# Patient Record
Sex: MALE | Race: BLACK OR AFRICAN AMERICAN
[De-identification: names, ages, dates, MRNs, and addresses within clinical notes are randomized per-mention and may not be internally consistent; named-entity substitution may affect disease eponyms.]

---

## 2017-10-16 NOTE — RAD
PORTABLE AP CHEST X-RAY:

 

10/16/2017

 

HISTORY:

Altered mental status.

 

COMPARISON:

10/19/2006

 

FINDINGS:

A dual-lead right subclavian cardiac pacemaker device is noted in place.  The cardiac silhouette and
 pulmonary vasculature are within normal limits.  Vascular calcifications are seen in an ectatic tho
racic aorta.  There is bilateral glenohumeral osteoarthropathy with narrowing of the subacromial spa
saúl bilaterally.

 

IMPRESSION:

No acute cardiopulmonary process.

 

POS: [facility]

## 2017-10-16 NOTE — PDOC.PN
- Subjective


Encounter Start Date: 10/16/17


Encounter Start Time: 08:42


Subjective: Confused


-: No agitation


-: No fevers this AM





- Objective


MAR Reviewed: Yes


Vital Signs & Weight: 


 Vital Signs (12 hours)











  Temp Pulse Resp BP Pulse Ox


 


 10/16/17 07:56  98.1 F  61  18  136/70  100








 Weight











Weight                         148 lb 0.011 oz














Result Diagrams: 


 10/16/17 05:01





 10/16/17 05:01





Phys Exam





- Physical Examination


Constitutional: NAD


HEENT: PERRLA, moist MMs


Neck: no nodes, no JVD


Respiratory: no wheezing, no rales, no rhonchi


Cardiovascular: RRR, no significant murmur


Gastrointestinal: soft, non-tender, positive bowel sounds


Deviation from normal: unable to assess


Skin: no rash, normal turgor





Dx/Plan


(1) History of CVA (cerebrovascular accident)


Code(s): Z86.73 - PRSNL HX OF TIA (TIA), AND CEREB INFRC W/O RESID DEFICITS   

Status: Acute   





(2) Metabolic encephalopathy


Code(s): G93.41 - METABOLIC ENCEPHALOPATHY   Status: Acute   





(3) NSTEMI (non-ST elevated myocardial infarction)


Code(s): I21.4 - NON-ST ELEVATION (NSTEMI) MYOCARDIAL INFARCTION   Status: 

Acute   





- Plan





Encephalopathy 2/2 UTI


* CT brain: no acute issue


* CXR: pending


* Urine cx: pending


* Blood cx: pending


* start Emperic Abx: Ceftriaxone


* consult ST to assess swallow function, as he is at risk for aspiration while 

encephalopathic


* Fall precautions





NSTEMI


* trend cardiac enzymes


* cardiology consulted





H/o CVA with Physical Deconditioning


* PT/OT


* Fall precautions





Dispo: continue inpt.

## 2017-10-16 NOTE — HP
CHIEF COMPLAINT:  Fall, confusion.

 

HISTORY OF PRESENT ILLNESS:  The patient is a 76-year-old male with past 
medical history of hypertension, CVA, hyperlipidemia, osteoarthritis, now came 
to the hospital because of the fall and history obtained from the patient's 
family as the patient is currently confused.  Per family members, the patient 
has complained of bilateral lower extremity swelling and pain since this 
morning.  The patient had a fall, following the fall the patient appears 
confused and so EMS was called.  Upon EMS arrival the patient was found 
confused.  The patient was brought to the ER.  The patient is lethargic, but no 
other history available from the patient at this time, The patient per family, 
the patient was confused since this evening.  Denies any cough, denies sputum 
production, denies any fever, denies any chills.

 

PAST MEDICAL HISTORY:  As per HPI.

 

PAST SURGICAL HISTORY:  Pacemaker.

 

SOCIAL HISTORY:  Denies smoking, denies alcohol, denies any drugs.

 

MEDICATIONS:  Reviewed.

 

FAMILY HISTORY:  Positive for heart problems.

 

REVIEW OF SYSTEMS:  Unavailable from the patient as the patient is currently 
lethargic.

 

PHYSICAL EXAMINATION:  

CONSTITUTIONAL/VITAL SIGNS:  At the time of H\T\P performed, blood pressure is 
130/70, afebrile, pulse ox 97% on 3 liters.

GENERAL:  The patient appears lethargic, but arousable.

HEENT:  Eyes; scleral icterus, conjunctival erythema present, nose normal, ENT 
patent.  Poor dentition.  

NECK:  Supple, no JVD.

CARDIOVASCULAR:  S1, S2 present.  Regular rate and rhythm, no murmurs, no rubs, 
no gallops.

RESPIRATORY SYSTEM:  Positive for rhonchi.  No wheezing.  Diminished at the 
bases.

GASTROINTESTINAL:  Abdomen is soft, nontender, no guarding, no organomegaly, no 
masses felt.

MUSCULOSKELETAL:  Bilateral 2+ edema present.

INTEGUMENTARY:  No rashes seen.

PSYCHIATRIC:  Cranial nerve intact.  Follows commands.  Strength intact, 
sensory intact.

GENITOURINARY:  No suprapubic tenderness.

NEUROLOGIC:  Patient follows commands, not oriented.

PSYCHIATRIC:  Mood calm.

 

LABORATORY:  White count 7.7, hemoglobin 15.2, platelet count is 271.  Sodium 
147, potassium 3.5, chloride 110, CO2 of 23, BUN of 24, creatinine 1.5.  Lactic 
acid 3.7, CK-MB is 43.8, troponin 0.141, globulin 3.9.  EKG, no acute ST 
changes.  Lactic acid 3.7.

 

IMAGING:  CT head, no acute process seen.

 

ASSESSMENT AND PLAN:    The patient is a 76-year-old male.

1.  Metabolic encephalopathy, need to rule out urinary tract infection.  Plan 
to consult Neurology to evaluate the patient.  Plan to monitor the patient 
closely.

2.  History of hypertension, monitor blood pressure.  Continue pressure meds.

3.  Non-ST-segment elevation myocardial infarction.  Plan to check cardiac 
enzymes.  Plan to consult Cardiology.  Plan to give a dose of Lovenox if CT 
head negative.

4.  History of cerebrovascular accident.  Plan to monitor the patient closely.  
Plan to get PT, OT, speech therapy evaluation. 

5.  History of hypertension.  Monitor blood.  Continue blood pressure meds.  

 

The case was discussed in detail with the patient.  The patient is full code.

 

MTDD

## 2017-10-16 NOTE — PDOC.EVN
Event Note





- Event Note


Event Note: 





[id number]


H&P Dictated


1. Metabolic encephalopathy


2. H/O CVA


3. NSTEMI


4. h/o htn





pLAN:


SEE ORDERS

## 2017-10-16 NOTE — CON
DATE OF CONSULTATION:  10/16/2017

 

REASON FOR CONSULTATION:  Syncope.

 

REFERRING PROVIDER:  Dr. Yoon.

 

HISTORY OF PRESENT ILLNESS:  Mr. Reyes is a very pleasant 76-year-old gentleman, who I have seen and 
evaluated in the past.  He has a history of moderate CAD, diagnosed in 2011 during angiography.  He 
recently presented with weakness and fall.  No syncope, presyncope noted.  His family states he fell
 to the floor and was there for several hours.  This occurred in the middle of night.  He was then b
rought to the emergency room with altered mental status.  No chest pain or pressure noted.

 

PAST MEDICAL AND SURGICAL HISTORY:  Sick sinus syndrome, status post pacemaker implantation, hyperte
nsion, moderate MR, moderate AI and nonischemic cardiomyopathy.

 

ALLERGIES:  IODINE.

 

HOME MEDICATIONS:  Include folic acid, methotrexate, Lasix, misoprostol, diclofenac, aspirin, carved
ilol, ramipril and amlodipine.

 

REVIEW OF SYSTEMS:  Ten-point review of system is difficult to obtain.

 

PHYSICAL EXAMINATION:

GENERAL:  Patient is a pleasant male who is in no acute distress.  The patient appears his stated ag
e.

VITAL SIGNS:  Blood pressure 108/58, pulse 60 and temperature 98.

NEUROLOGIC:  The patient is alert and oriented x3 with no focal neurologic deficits.

HEENT:  Sclerae without icterus.  Mouth has moist mucous membranes with normal pallor.

NECK:  No JVD.  Carotid upstroke brisk.  No bruits bilaterally.

LUNGS:  Clear to auscultation with unlabored respirations.

BACK:  No scoliosis or kyphosis.

CARDIAC:  Regular rate and rhythm with normal S1 and S2.  No S3 or S4 noted.  No significant rubs, m
urmurs, thrills, or gallops noted throughout the precordium.  PMI is not displaced.  There is no par
asternal heave.

ABDOMEN:  Soft, nontender, nondistended.  No peritoneal signs present.

No hepatosplenomegaly.  No abnormal striae.

EXTREMITIES:  2+ femoral and 2+ dorsalis pedis pulses.  No cyanosis, clubbing, or edema.

SKIN:  No gross abnormalities.

 

PERTINENT LABS:  Hemoglobin 15.2.  Creatinine 1.05, CK-MB of 43 and peak troponin 0.144.

 

IMPRESSION:

1.  Altered mental status.

2.  Weakness and falls.

 

RECOMMENDATIONS:  I am unsure whether Mr. Reyes has had a true syncope.  It is difficult to obtain a 
history.  I would recommend interrogation of his pacer.  His elevated CK-MB is likely related to his
 recent fall and being on the ground for several hours.  This is also a reflection of his troponin. 
 At this point, would continue conservative therapy.  Last echo in the office was in 12/2015, we wou
ld recommend a repeat study.  His LVEF at that time was 55% to 60%.

## 2017-10-16 NOTE — CT
PRELIMINARY REPORT/[facility]/EMERGENCY AFTER

HOURS PROCEDURE:

 

EXAM:

CT Head Without Intravenous Contrast

 

CLINICAL HISTORY:

76 years old, male; Signs and symptoms; Altered mental status/memory loss; Confusion or disorientati
on; Patient HX: AMS

 

TECHNIQUE:

Axial computed tomography images of the head/brain without intravenous contrast.

 

COMPARISON:

No relevant prior studies available.

 

FINDINGS:

Brain: Mild volume loss No hemorrhage. Mild white matter disease. No edema.

Ventricles: Unremarkable. No ventriculomegaly.

Bones/joints: Chronic nasal bone deformities No acute fracture.

Soft tissues: Unremarkable.

Sinuses: Unremarkable as visualized. No acute sinusitis.

Mastoid air cells: Unremarkable as visualized. No mastoid effusion.

 

IMPRESSION:

No intracranial hemorrhage.Please see discussion above.

 

Thank you for allowing us to participate in the care of your patient.

 

Dictated and Authenticated by: Luis Fernando Saldaña MD

10/16/2017 5:28 AM Central Time ([place] \T\ [place])

 

 

 

 

 

                          FINAL REPORT

 

EMERGENT AFTER HOURS CT HEAD WITHOUT IV CONTRAST

10/16/2017

 

HISTORY:

Worsening altered mental status.

 

COMPARISON: 

06/24/2017

 

IMPRESSION:   

1. No acute intracranial abnormalities demonstrated.

 

2. Chronic small vessel ischemic changes and cerebral volume loss similar to prior study.

 

3. Findings are in agreement with the preliminary report by Ariana.

 

POS: [facility]

## 2017-10-17 NOTE — PRG
DATE OF SERVICE:  10/17/2017

 

SUBJECTIVE:  Mr. Reyes's status is unchanged.  He recently had his pacemaker interrogated with no sig
nificant arrhythmias present.  Pacemaker appears to be functioning well.

 

OBJECTIVE:

VITAL SIGNS:  Blood pressure 125/58, temperature 98.5.

LUNGS:  Clear to auscultation.

CARDIAC:  Regular rate and rhythm.

ABDOMEN:  Soft, nontender, nondistended.

EXTREMITIES:  No edema.

 

IMPRESSION:

1.  Recent fall.

2.  Elevated troponin.

 

RECOMMENDATIONS:  Elevated troponin and CK-MB from recent fall.  We would not proceed with a more ag
gressive approach.  We will proceed with a conservative approach.  Cardiovascular wise, he appears s
table.  I have no further recommendations.  It would be okay from my standpoint to discontinue tele.

## 2017-10-17 NOTE — RAD
LEFT HIP TWO VIEWS:

10/17/17

 

HISTORY: 

Fall, left hip pain.

 

FINDINGS/IMPRESSION:  

Degenerative changes are present. No definite fracture or dislocation is identified. 

 

If there is high clinical suspicion for fracture, further evaluation with CT scan or MRI should be p
erformed. 

 

POS: [facility]

## 2017-10-17 NOTE — PDOC.PN
- Subjective


Encounter Start Date: 10/17/17


Encounter Start Time: 08:31


Subjective: More coherent today.


-: No f/c


-: No HA/cp/sob





- Objective


MAR Reviewed: Yes


Vital Signs & Weight: 


 Vital Signs (12 hours)











  Temp Pulse Resp BP Pulse Ox


 


 10/17/17 07:48  97.4 F L  62  18  136/72  98


 


 10/17/17 04:00  99.8 F H  63  16  164/70 H  97


 


 10/17/17 00:00   62  18  116/58 L  96








 Weight











Weight                         148 lb 12.8 oz














Result Diagrams: 


 10/17/17 05:52





 10/17/17 05:52


Additional Labs: 


 Accuchecks











  10/16/17





  20:22


 


POC Glucose  121 H














Phys Exam





- Physical Examination


Constitutional: NAD


HEENT: moist MMs, sclera anicteric


Neck: no nodes, no JVD


Respiratory: no wheezing, no rales, clear to auscultation bilateral


Cardiovascular: RRR, no significant murmur


Gastrointestinal: soft, non-tender, positive bowel sounds


Psychiatric: normal affect, A&O x 3


Skin: no rash, normal turgor, cap refill <2 seconds





Dx/Plan


(1) History of CVA (cerebrovascular accident)


Code(s): Z86.73 - PRSNL HX OF TIA (TIA), AND CEREB INFRC W/O RESID DEFICITS   

Status: Acute   





(2) Metabolic encephalopathy


Code(s): G93.41 - METABOLIC ENCEPHALOPATHY   Status: Acute   





(3) NSTEMI (non-ST elevated myocardial infarction)


Code(s): I21.4 - NON-ST ELEVATION (NSTEMI) MYOCARDIAL INFARCTION   Status: 

Acute   





- Plan





Encephalopathy 2/2 UTI


* CT brain: no acute issue


* CXR: no acute issue


* Urine cx: mixed - will repeat 


* Blood cx: NGTD


* Emperic Abx: Ceftriaxone


* consulted ST to assess swallow function - modified diet


* Fall precautions





NSTEMI


* trend cardiac enzymes


* appreciate cardiology input





H/o CVA with Physical Deconditioning


* PT/OT


* Fall precautions





Hypokalemia


* will order 40mEq K-dur today


* check K and Mg in AM





Hypernatremia


* start 1/2NS at 50ml/hr


* check Na in AM





Dispo: continue inpt.

## 2017-10-18 NOTE — CON
DATE OF CONSULTATION:  10/16/2017

 

REFERRING PROVIDER:  Alex Kendrick M.D.

 

REASON FOR CONSULTATION:  Altered mental status.

 

HISTORY OF PRESENT ILLNESS:  Mr. Reyes is a pleasant 76-year-old  male who has been concerne
d for evaluation of altered mental status.  History is very limited.  The patient is a very poor his
oscar and there are no one present at bedside, thus most of the history is obtained from the patien
t's dictated H and P note as well as nurse who is taking care of the patient.  Apparently, the patie
nt has a history of hypertension, hyperlipidemia, stroke, and osteoarthritis.  He had presented to [facility] after sustaining a fall.  He was noted by family members to have weakness in both lower 
extremities as well as swelling and pain in both lower extremities.  He was also noted to be confuse
d and disoriented and lethargic for which he was brought to the [facility] for furthe
r evaluation.  According to the nurse, there is a complete things history between different family m
embers and some family members who have mentioned that he at baseline does not want to be bed bound 
and has very slurred speech which is difficult to understand and other family members have mentioned
 that he is at baseline.  He is able to walk with supportive of walker and has slurred speech, but t
hat has been improved since his stroke and they are able to understand him.

 

PAST MEDICAL HISTORY:  Significant for hypertension, hyperlipidemia, osteoarthritis, and stroke.

 

PAST SURGICAL HISTORY:  Significant for pacemaker placement.

 

SOCIAL HISTORY:  He does not smoke cigarettes, drink alcohol or use illicit drugs.  He lives with hi
s family members.

 

FAMILY HISTORY:  Significant for heart problems.

 

CURRENT MEDICATIONS:  Please review MAR.

 

ALLERGIES:  Include IODINE.

 

REVIEW OF SYSTEMS:  As mentioned in the HPI, otherwise negative _____.

 

PHYSICAL EXAMINATION:

VITAL SIGNS:  Blood pressure of 108/58, pulse of 68, temperature of 98.4, respirations of 18, O2 sat
s of 99% on 1.5 liters nasal canula.

GENERAL:  Well-developed, well-nourished male resting in bed in no apparent distress.

RESPIRATORY:  Clear to auscultation bilaterally.

CARDIOVASCULAR:  Regular rate and rhythm.

NEUROLOGIC:  Mental status:  The patient is awake, alert, and oriented to person only.  Speech and l
anguage:  Slightly dysarthric speech noted.  Cranial nerves:  Pupils are 3 mm and reactive.  Visual 
fields are full to threat.  External muscles are intact.  No nystagmus is noted.  Face is symmetric.
  Motor exam showed normal tone and bulk with 5/5 strength in both upper and lower extremities.  Sen
zeinab:  Sensation appears intact.  Deep tendon reflexes 1+ reflex in both upper and lower extremities
.  Babinski:  Plantar responses flexion bilaterally.  Coordination:  Gait and Romberg could not be t
ested.

 

LABORATORY DATA:  Reviewed which included CBC, CMP, CK-MB and troponin, which is significant for sod
ium of 147.  Lactic acid 2.7.  AST of 75, CK-MB of 42.8, troponin of 0.141.  Hemoglobin of 11.8, hem
atocrit of 26.4, otherwise unremarkable.  Urinalysis was reviewed, which showed 11 to 20 wbc, modera
te leukocyte esterase and negative nitrites.

 

IMAGING STUDIES:  CT head without contrast was reviewed, which showed no acute intracranial abnormal
ity.

 

IMPRESSION:

1.  Altered mental status, likely toxic metabolic encephalopathy.

2.  Urinary tract infection.

 

Mr. Reyes is a pleasant 76-year-old  male who presented with changes in mentation and confus
ion.  It is difficult to evaluate his underlying etiology; however, this is likely secondary to toxi
c metabolic encephalopathy.  He is not able to undergo MRI secondary to pacemaker.  I would recommen
d obtaining urine culture and possibility of starting antibiotics for UTI.  Continue supportive care
.  Continue current medical management.  No further neurologic workup needed from my standpoint.

 

Thank you for the consultation.

## 2017-10-18 NOTE — PDOC.PN
- Subjective


Encounter Start Date: 10/18/17


Encounter Start Time: 08:30


Subjective: Awake/alert


-: Very weak


-: No agitation





- Objective


MAR Reviewed: Yes


Vital Signs & Weight: 


 Vital Signs (12 hours)











  Temp Pulse Resp BP Pulse Ox


 


 10/18/17 07:49  98.2 F  62  15  130/61  98


 


 10/18/17 04:22  98.9 F  66  20  116/59 L  98


 


 10/17/17 23:44  99.0 F  63  14  128/79  98








 Weight











Admit Weight                   148 lb 12.8 oz


 


Weight                         148 lb 12.8 oz














I&O: 


 











 10/17/17 10/18/17 10/19/17





 06:59 06:59 06:59


 


Intake Total  1850 


 


Output Total  440 


 


Balance  1410 











Result Diagrams: 


 10/17/17 05:52





 10/17/17 05:52





Phys Exam





- Physical Examination


Constitutional: NAD


HEENT: PERRLA, moist MMs


Neck: no nodes, no JVD


Respiratory: no wheezing, no rales


Cardiovascular: RRR, no significant murmur


Gastrointestinal: soft, non-tender, positive bowel sounds


Lymphatic: no nodes


Psychiatric: normal affect


Skin: no rash, normal turgor





Dx/Plan


(1) History of CVA (cerebrovascular accident)


Code(s): Z86.73 - PRSNL HX OF TIA (TIA), AND CEREB INFRC W/O RESID DEFICITS   

Status: Acute   





(2) Metabolic encephalopathy


Code(s): G93.41 - METABOLIC ENCEPHALOPATHY   Status: Acute   





(3) NSTEMI (non-ST elevated myocardial infarction)


Code(s): I21.4 - NON-ST ELEVATION (NSTEMI) MYOCARDIAL INFARCTION   Status: 

Acute   





- Plan





Encephalopathy 2/2 UTI


* CT brain: no acute issue


* CXR: no acute issue


* Urine cx: yeast - but improving WITHOUT antifungals (?contaminant) - repeat 

pending


* Blood cx: NGTD


* Emperic Abx: Ceftriaxone


* consulted ST to assess swallow function - modified diet


* Fall precautions





NSTEMI


* trend cardiac enzymes


* appreciate cardiology input





H/o CVA with Physical Deconditioning


* PT/OT


* Fall precautions





Hypokalemia


* replace prn


* check K and Mg in AM





Hypernatremia


* 1/2NS at 50ml/hr


* check Na in AM





Physical Debility


* PT/OT





Left Hip pain s/p fall


* XR - no fracture/dislocation


* will check CT if not improving





Dispo: continue inpt.

## 2017-10-19 NOTE — PDOC.PN
- Subjective


Encounter Start Date: 10/19/17


Encounter Start Time: 08:55


-: old records requested/rev





Pt seen and examined on rounds, chart reviewed in its entrety.  Daughter at 

bedside feeing him, he is awake and alert and answering appropriately.





Daughter states he is back to his baseline mental status.  No F/C, no N/V/D/C, 

no CP or SOB, no cough or sputum production





10 point ROs performed and neg for all systems except as per HPI.  PT has not 

seen or ambulated the patient yet





- Objective


MAR Reviewed: Yes


Vital Signs & Weight: 


 Vital Signs (12 hours)











  Temp Pulse Resp BP BP Pulse Ox


 


 10/19/17 09:22   61   117/60  


 


 10/19/17 04:00  98.5 F  60  20   121/59 L  93 L








 Weight











Admit Weight                   148 lb 12.8 oz


 


Weight                         142 lb 14.4 oz














I&O: 


 











 10/18/17 10/19/17 10/20/17





 06:59 06:59 06:59


 


Intake Total 2640 1817 


 


Output Total 440  


 


Balance 2200 1817 











Result Diagrams: 


 10/19/17 05:41





 10/19/17 05:41


Radiology Reviewed by me: Yes


EKG Reviewed by me: Yes





Phys Exam





- Physical Examination


Constitutional: NAD


HEENT: PERRLA, moist MMs, sclera anicteric, oral pharynx no lesions


Neck: no nodes, no JVD, supple, full ROM


Respiratory: no wheezing, no rales, no rhonchi, clear to auscultation bilateral


Cardiovascular: RRR, no significant murmur, no rub


Gastrointestinal: soft, non-tender, no distention, positive bowel sounds


Musculoskeletal: no edema, pulses present


left weakness stable, CN 2-12 appear to be grossly intact


Lymphatic: no nodes


Psychiatric: normal affect, A&O x 3


Skin: no rash, normal turgor, cap refill <2 seconds





Dx/Plan


(1) Candidal UTI (urinary tract infection)


Code(s): B37.49 - OTHER UROGENITAL CANDIDIASIS   Status: Acute   Comment: start 

fluconazole for 7-10 days.  Present on admit   





(2) HTN (hypertension)


Code(s): I10 - ESSENTIAL (PRIMARY) HYPERTENSION   Status: Chronic   


Qualifiers: 


   Hypertension type: essential hypertension   Qualified Code(s): I10 - 

Essential (primary) hypertension   


Comment: stable, Contiue home meds   





(3) History of CVA (cerebrovascular accident)


Code(s): Z86.73 - PRSNL HX OF TIA (TIA), AND CEREB INFRC W/O RESID DEFICITS   

Status: Chronic   





(4) Metabolic encephalopathy


Code(s): G93.41 - METABOLIC ENCEPHALOPATHY   Status: Resolved   Comment: 

present on admit, resolved.   





(5) NSTEMI (non-ST elevated myocardial infarction)


Code(s): I21.4 - NON-ST ELEVATION (NSTEMI) MYOCARDIAL INFARCTION   Status: 

Resolved   Comment: ruled out.  likely due to fall per cardiology.   





- Plan


cont current plan of care, plan discussed w/ family, PT/OT





* .


anticipate discharge in 1-2 days

## 2017-10-20 NOTE — PDOC.PN
- Subjective


Encounter Start Date: 10/20/17


Encounter Start Time: 09:25


Subjective: awake, has severe dysathria and is difficult to understand 


-: not in distress, moves all extre well





- Objective


MAR Reviewed: Yes


Vital Signs & Weight: 


 Vital Signs (12 hours)











  Temp Pulse Pulse Pulse Resp BP BP


 


 10/20/17 10:07   66     


 


 10/20/17 09:08    64  60   123/82  161/62 H


 


 10/20/17 07:45  98.6 F  66    20  


 


 10/20/17 04:00  98.6 F  76    20  


 


 10/20/17 00:00      18  














  BP Pulse Ox


 


 10/20/17 10:07  


 


 10/20/17 09:08  


 


 10/20/17 07:45  169/81 H  98


 


 10/20/17 04:00  153/79 H  93 L


 


 10/20/17 00:00  








 Weight











Admit Weight                   148 lb 12.8 oz


 


Weight                         138 lb 4.8 oz














I&O: 


 











 10/19/17 10/20/17 10/21/17





 06:59 06:59 06:59


 


Intake Total 1817 1210 


 


Balance 1817 1210 











Result Diagrams: 


 10/20/17 04:51





 10/20/17 04:51





Phys Exam





- Physical Examination


HEENT: PERRLA, sclera anicteric


Neck: no JVD, supple


Respiratory: no wheezing, no rales


Cardiovascular: RRR, no significant murmur


Gastrointestinal: soft, non-tender, positive bowel sounds


Musculoskeletal: no edema, pulses present


Neurological: non-focal, moves all 4 limbs





Dx/Plan


(1) Candidal UTI (urinary tract infection)


Code(s): B37.49 - OTHER UROGENITAL CANDIDIASIS   Status: Acute   Comment: 

fluconazole for 5 days.  Present on admit   





(2) HTN (hypertension)


Code(s): I10 - ESSENTIAL (PRIMARY) HYPERTENSION   Status: Chronic   


Qualifiers: 


   Hypertension type: essential hypertension   Qualified Code(s): I10 - 

Essential (primary) hypertension   


Comment: stable, Contiue home meds   





(3) History of CVA (cerebrovascular accident)


Code(s): Z86.73 - PRSNL HX OF TIA (TIA), AND CEREB INFRC W/O RESID DEFICITS   

Status: Chronic   





(4) Metabolic encephalopathy


Code(s): G93.41 - METABOLIC ENCEPHALOPATHY   Status: Resolved   Comment: 

present on admit, resolved.   





(5) NSTEMI (non-ST elevated myocardial infarction)


Code(s): I21.4 - NON-ST ELEVATION (NSTEMI) MYOCARDIAL INFARCTION   Status: 

Resolved   Comment: ruled out.  likely due to fall per cardiology.   





- Plan


is on ceftriaxone and fluconazole


-: gentle iv hydration, encourage po intake


-: speech eval, has dysarthria, not sure if he has diff swallowing


-: may dc to rehab if accepted





* .








Review of Systems





- Medications/Allergies


Allergies/Adverse Reactions: 


 Allergies











Allergy/AdvReac Type Severity Reaction Status Date / Time


 


iodine Allergy Intermediate  Verified 06/25/17 01:00











Medications: 


 Current Medications





Acetaminophen (Tylenol)  650 mg PO Q4H PRN


   PRN Reason: Headache/Fever or Pain


   Last Admin: 10/18/17 21:05 Dose:  650 mg


Amlodipine Besylate (Norvasc)  5 mg PO DAILY [facility]


   Last Admin: 10/20/17 10:07 Dose:  5 mg


Aspirin (Ecotrin)  325 mg PO DAILY [facility]


   Last Admin: 10/20/17 10:07 Dose:  325 mg


Atorvastatin Calcium (Lipitor)  10 mg PO HS [facility]


   Last Admin: 10/19/17 21:47 Dose:  10 mg


Carvedilol (Coreg)  25 mg PO BID [facility]


   Last Admin: 10/20/17 10:08 Dose:  25 mg


Fluconazole (Diflucan)  200 mg PO DAILY [facility]


   Last Admin: 10/20/17 10:07 Dose:  200 mg


Sodium Chloride (1/2 Normal Saline)  1,000 mls @ 50 mls/hr IV .Q20H [facility]


   Last Admin: 10/19/17 15:30 Dose:  1,000 mls


Ceftriaxone Sodium 1 gm/Miscellaneous Medication 1 each/ Sodium Chloride  100 

mls @ 200 mls/hr IVPB Q24HR [facility]


   Last Admin: 10/20/17 10:07 Dose:  100 mls


Ondansetron HCl (Zofran)  4 mg IVP Q6H PRN


   PRN Reason: Nausea/Vomiting


Ramipril (Altace)  10 mg PO DAILY [facility]


   Last Admin: 10/20/17 10:08 Dose:  10 mg


Sodium Chloride (Flush - Normal Saline)  10 ml IVF Q12HR [facility]FA


   Last Admin: 10/20/17 10:08 Dose:  10 ml


Sodium Chloride (Flush - Normal Saline)  10 ml IVF PRN PRN


   PRN Reason: Saline Flush

## 2017-10-20 NOTE — DIS
DATE OF ADMISSION:  10/16/2017

 

DATE OF DISCHARGE:  10/20/2017

 

DISCHARGE DISPOSITION:  To inpatient rehabilitation.

 

PRIMARY DISCHARGE DIAGNOSES:  Metabolic encephalopathy, urinary tract infection, non-ST elevation my
ocardial infarction.

 

SECONDARY DISCHARGE DIAGNOSES:  History of cerebrovascular accident with dysarthria and hypertension
.

 

PROCEDURES DONE DURING HOSPITALIZATION:  The patient has had CT brain done on the day of admission w
[facility] showed no intracranial hemorrhage or infarct.  Chest x-ray done showed no acute cardiopulmonary
 process.  Echo with 2D Doppler showed EF of 50%-55% and there was diastolic dysfunction, no severe 
valvular abnormality seen.  Blood cultures x2 no growth.  Urine culture done on 16th grew yeast spec
ies 10,000-25,000 colony forming units per mL.  There was also mixed nancy seen.  H\T\H 12 and 38, p
latelet count 245,000.  Troponin I was indeterminate with peaking up to 0.17, CK-MB 43.8, .  
Lactic acid was 3.7.  UA was positive for moderate leukocyte esterase.

 

DISCHARGE MEDICATIONS:  Norvasc 5 mg p.o. daily, aspirin 325 mg p.o. daily, Lipitor 10 mg p.o. at be
dtime, Coreg 25 mg p.o. twice daily, Voltaren 50 mg p.o. 3 times daily, fluconazole 100 mg p.o. carmela
y for another 3 days, folic acid 1 mg p.o. daily, methotrexate 2.5 mg p.o. once weekly, misoprostol 
200 mcg p.o. 3 times daily, ramipril 10 mg p.o. daily.

 

ALLERGIES:  IODINE.

 

DISCHARGE PLAN:  The patient is being discharged to inpatient rehabilitation.

 

BRIEF COURSE DURING HOSPITALIZATION:  The patient initially was brought to emergency room with histo
ry of falls and confusion at home.  He was found to have had urinary tract infection with indetermin
ate troponins on admission.  The patient was admitted to telemetry.  He was closely monitored.  His 
cultures grew yeast species and was contaminated with mixed culture.  He was on IV antibiotics, whic
h has been discontinued at the time of discharge.  He needs to continue fluconazole for another 3 da
ys.  The patient was evaluated by Dr. Cee for Cardiology and Dr. Sapphire Ramirez for Neurology.  He
 has been cleared by both for discharge.  Due to deconditioning and what appears to be patient's bas
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

 

A total of 35 minutes was spent on discharge plan.  Please see the face to face documentation on [employer] for the day of discharge.

## 2017-10-22 NOTE — XMS
Clinical Summary

 Created on:2017



Patient:Cory Reyes

Sex:Male

:1940

External Reference #:[id number]





Demographics







 Address  [address] [place] [address]

 

 Home Phone  [phone]

 

 Preferred Language  English

 

 Marital Status  Single

 

 Sikhism Affiliation  Unknown

 

 Race  Unknown

 

 Ethnic Group  Unknown









Author







 Organization  [facility]

 

 Address  [address] [place] [address]

 

 Phone  [phone]









Care Team Providers







 Name  Role  Phone

 

 ,  Primary Care Provider  Unavailable









Allergies

Not on File



Current Medications

Not on file



Active Problems

Not on file



Social History







 Tobacco Use  Types  Packs/Day  Years Used  Date

 

 Never Assessed        









 Sex Assigned at Birth  Date Recorded

 

 Not on file  







Last Filed Vital Signs

Not on file



Plan of Treatment

Not on file



Results

Not on filefrom Last 3 Months

## 2017-12-10 NOTE — CT
FACIAL BONES CT WITHOUT CONTRAST:

 

Date:  12/10/17 

 

HISTORY:  

Fall. Left facial trauma. 

 

COMPARISON:  

None. 

 

TECHNIQUE:  

Maxillofacial CT is performed without contrast. Coronal and sagittal reformatted images are submitted
 for interpretation. 

 

FINDINGS:

There is adequate aeration of the visualized sinuses. Old right lamina papyracea fracture is noted. Z
ygomatic arches are intact. Pterygoid plates are intact. Visualized maxilla and mandible do not demon
strate any post-traumatic change. Periodontal disease is identified. 

 

Visualized brain parenchyma is unremarkable. 

 

Symmetric attenuation of the optic nerves and ocular rectus muscles. Retrobulbar fat is preserved. 

 

Coronal reformatted images demonstrate previous left uncinectomy. No evidence of significant sinus op
acification. Nasal septum is intact. 

 

The osseous margins of the orbits are maintained. 

 

There is a small left periorbital hematoma. There is a small left frontal scalp hematoma.

 

IMPRESSION: 

Left periorbital hematoma. No evidence of an associated fracture. 

 

 

POS: [facility]

## 2017-12-10 NOTE — RAD
FRONTAL VIEW CHEST:

 

Date:  12/10/17 

 

No prior comparison. 

 

INDICATION:

Fall with injury, pain. 

 

FINDINGS:

There is a right side dual lead cardiac pacing device again seen. Cardiac silhouette is accentuated b
y portable technique, mildly enlarged. The lungs are clear. No effusion or discrete pneumothorax. No 
acute osseous abnormality identified. 

 

IMPRESSION: 

No acute process identified by single frontal view chest. 

 

 

POS: [facility]

## 2017-12-10 NOTE — CON
DATE OF CONSULTATION:  12/10/2017

 

This is a 30-minute initial patient evaluation, which greater than 50% of the exam was spent in couns
eling and coordinating patient's care.  The remaining of the exam was spent review of patient's medic
al records and appropriate imaging studies.

 

CHIEF COMPLAINT:  Status post fall with incidental finding of right frontal hygroma.

 

HISTORY OF PRESENT ILLNESS:  Mr. Reyes is a 77-year-old male who presents to [facility]
 with family members for the above complaints.  Currently, the patient has history of suffering a CVA
 and has been on 325 mg aspirin since that time.  The patient's family notes per report that he fell 
out of bed this morning sustaining a left eye laceration, this has been repaired at bedside in the ED
.  The patient does have slurred speech, but this is baseline for him again due to his previous strok
e.  CT was obtained of the head noting collection of fluid in the right frontal lobe, mostly consiste
nt with hygroma with a possibility of perhaps a chronic subdural hematoma or perhaps a subdural hemat
kamilla.  On review of patient's CT scan of the cervical spine, is negative for acute fracture.

 

PHYSICAL EXAMINATION:  The patient is awake, alert, and appropriate, although he does have slurred sp
eech.  He is able to follow commands in all 4 extremities, although has baseline left-sided weakness 
due to previous stroke.  His pupils are equal, round, and reactive bilaterally.

 

IMPRESSION:  Status post fall with chronic right frontal lobe fluid collection likely hygroma versus 
chronic subdural hematoma.

 

PLAN:  I have discussed the patient's case with Dr. Steven and both of us have examined the patient a
nd I have discussed his case with his family at bedside.  At this time, he does not require any emerg
ent neurosurgical intervention as there is no compressive aspect of the fluid collection.  Likely, th
is has been there for several weeks to months and this should resolve without neurosurgical intervent
ion.  We have discussed with the patient's family that he should remain off his aspirin for the next 
week.  We will schedule followup appointment in our office next week with a repeat noncontrast head C
T.  The patient and his family were given ample opportunity to discuss their questions and concerns a
nd they are certainly pleased that he does not require neurosurgical intervention at this time.  We w
ill schedule appropriate followup outpatient.

 

This is Rich Rose PA-C dictating for Dr. Maury Steven.

## 2017-12-10 NOTE — CT
NONCONTRAST HEAD CT:

 

Date:  12/10/17 

 

COMPARISON:  

06/24/17, 10/18/17. 

 

HISTORY:  

Patient has fallen and has trauma to left eyebrow. 

 

TECHNIQUE:  

Noncontrast head CT is performed from skull base to skull vertex. 

 

FINDINGS:

No parenchymal hemorrhage. No midline shift. Basilar cisterns are patent. Brain volume is age-appropr
iate. Cortical gray-white matter differentiation is preserved. Stable configuration of ventricular sy
stem. Stable white matter hypodensities due to chronic small vessel ischemic change. 

 

Since the previous exam, there is evidence of a hypodensity, extra-axial in location, along the right
 frontal convexity and right parietal convexity near the vertex. There is mild associated sulcal effa
cement of the right frontal lobe. Given the attenuation, a subdural hygroma is favored. There is no e
vidence of hyperdensity to suggest acute hemorrhage. 

 

Calvarium is intact. Adequate aeration of the sinuses and mastoid air cells. Cavernous carotid athero
sclerosis noted. 

 

IMPRESSION: 

1.  No evidence of acute intracranial hemorrhage. 

 

2.  Interval development of extra-axial hypodensity along the right frontal and parietal convexities 
as detailed above. There is mild associated sulcal effacement. Given the hypodense character, a subdu
ral hygroma is favored. Neurosurgical consultation is recommended for further evaluation. 

 

 

 

POS: [facility]

## 2017-12-10 NOTE — CT
CERVICAL SPINE CT NONCONTRAST:

 

Date:  12/10/17 

 

INDICATION:

Fall with neck pain, trauma. 

 

FINDINGS:

There is multilevel prominent degenerative change throughout the cervical spine without acute fractur
e or significant subluxation. Craniocervical junction is intact. No retropulsion of bone. 

 

IMPRESSION: 

Multilevel degenerative change throughout the cervical spine without acute fracture identified. 

 

 

POS: [facility]

## 2017-12-10 NOTE — RAD
FRONTAL VIEW PELVIS:

 

Date:  12/10/17 

 

INDICATION:

Fall with left hip pain. 

 

FINDINGS:

There is no fracture or dislocation. 

 

No significant change from 06/24/17 exam. 

 

IMPRESSION: 

No acute pelvic fracture identified. 

 

 

POS: [facility]

## 2017-12-18 NOTE — CT
NONCONTRAST HEAD CT:

 

Date:  12/18/17 

 

HISTORY:  

Subdural hygroma. Lymphangioma. 

 

COMPARISON:  

12/10/17. 

 

TECHNIQUE:  

A noncontrast head CT is performed from the skull base to the skull vertex. 

 

FINDINGS:

Stable postsurgical changes involving the left uncinate process. There is adequate aeration of the si
nuses and mastoid air cells. Cavernous carotid atherosclerosis is noted. 

 

The previously noted right frontotemporal extra-axial collection is less evident. The previously note
d sulcal effacement has decreased. No acute extra-axial hematoma. 

 

There is an interval hyperdensity in the left temporal lobe measuring 1.0 cm. Small parenchymal hemat
kamilla is favored. 

 

Basilar cisterns are patent. Age-appropriate atrophy. Cortical gray-white matter differentiation is p
reserved. 

 

Ventricles and sulci are patent and symmetric. 

 

IMPRESSION: 

1.  Interval decrease in size of a right extra-axial collection suggesting partial resolution of a po
ssible subdural hygroma or subdural hematoma. 

 

2.  Interval development of a hemorrhage in the left temporal lobe. 

 

Results of study discussed with Dr. Steven on 12/18/17 at 1354 hours.

CODE CR. 

 

 

POS: [facility]

## 2017-12-30 NOTE — RAD
PORTABLE CHEST ONE VIEW:

12/30/2017 at 3:25 p.m.

 

HISTORY:

Unwitnessed fall.

 

FINDINGS:

Comparison is made with the exam of 12/10/2017.

 

Right-sided pacing device remains in place.  The heart size is normal.  The lungs are expanded withou
t focal areas of consolidation, pneumothorax, or pleural effusions.  

 

IMPRESSION:  

No radiographic evidence of acute cardiopulmonary process.

 

POS: [facility]

## 2017-12-30 NOTE — CT
CT BRAIN WITHOUT CONTRAST:

 

HISTORY:

A 77-year-old male with trauma, fall, bump on the back of his head.

 

FINDINGS:

Comparison is made with the exam of 12/18/2017.

 

Changes of cortical atrophy and chronic small-vessel ischemic disease are again seen.  The previously
 noted small focal hyperdensity in the left temporal lobe measuring 1 cm is smaller and measures abou
t 7 mm on the current study.  The right subdural hygroma/hematoma demonstrates significant interval d
ecrease in size.  The ventricle size is stable, and the basilar cisterns are patent.  No evidence of 
infarct, new hemorrhage, or midline shift are identified.   The bony calvarium is intact.  The visual
ized paranasal sinuses and mastoid air cells are well-aerated.

 

IMPRESSION:  

Interval improvement since 12/18/2017.

 

 

POS: [facility]

## 2017-12-30 NOTE — CT
NONCONTRAST CT OF THE CERVICAL SPINE:

 

INDICATION:

Fall with neck pain.

 

COMPARISON: 

Comparison dated 12/10/2017.

 

IMPRESSION:  

1. No acute fracture or subluxation is evident. 

2. Stable thyromegaly.  Recommend consideration for thyroid ultrasound.

3. Stable multilevel spondylosis of the cervical spine.

 

COMMENTS:

Osseous central canal is preserved.  Craniocervical junction is normal-appearing.  No definite acute 
fracture or subluxation is evident.  There is diffuse osteopenia that is similar appearing.

 

POS: [facility]

## 2017-12-30 NOTE — HP
REASON FOR ADMISSION:  Fever of 101, influenza A, demand ischemia.

 

HISTORY OF PRESENT ILLNESS:  Please note the majority of this history is 
obtained by my talking to patient's family at bedside as patient is not fully 
oriented.  He follows simple verbal stimuli, but does not know what really 
happened and why he was brought to emergency room.  The family at bedside 
explained that he has not been feeling good from yesterday.  He has been 
feeling very weak and has been coughing.  He is not bringing up any sputum.  He 
normally walks with a walker and has fallen 2 times in the last two days.  
Patient was brought to emergency room here on the 12/18 after a fall with 
laceration to the left supraorbital area and has had suturing done for the 
same.  Currently, has not had any injuries from the fall.  They do not know the 
mechanism of fall all they know is, he was found on the floor and they picked 
him up.  There is also exposure to flu from his grandchildren in the house.  
Currently, does not complain of any chest pain or palpitation.  No complaints 
of any specific weakness in any of the extremities.  Had a fever of 101.2 
degrees here in the ER.

 

PAST MEDICAL AND SURGICAL HISTORY:  History of chronic indeterminate troponins, 
pacemaker, dyslipidemia, history of CVA with left hemiparesis, hypertension, 
dyslipidemia, osteoarthritis, recurrent falls, likely subdural hematoma, has 
had a generator change done on 11/19/2013 by Dr. Cohn for his pacemaker, likely 
has underlying dementia, history of lumbar radiculopathy, coronary artery 
disease, right inguinal hernia repair, cystoscopies, history of urethral 
stricture in the past.

 

CURRENT MEDICATIONS:  Takes misoprostol 200 mcg p.o. 3 times daily, ramipril 10 
mg p.o. daily, Norvasc 5 mg p.o. daily, diclofenac 50 mg p.o. 3 times daily, 
folic acid 1 mg p.o. daily, aspirin 325 mg p.o. daily, Coreg 25 mg p.o. twice 
daily, Lasix 20 mg p.o. daily, Haldol 5 mg as needed, methotrexate 10 mg once 
weekly.

 

ALLERGIES:  IODINE.

 

PERSONAL HISTORY:  Does not abuse alcohol or drugs.  No history of smoking.

 

FAMILY HISTORY:  There is history of heart disease in the family.

 

REVIEW OF SYSTEMS:  The following complete review of systems was negative, 
unless otherwise mentioned in the HPI or below:

Constitutional:  Weight loss or gain, ability to conduct usual activities.

Skin:  Rash, itching.

Eyes:  Double vision, pain.

ENT/Mouth:  Nose bleeding, neck stiffness, pain, tenderness.

Cardiovascular:  Palpitations, dyspnea on exertion, orthopnea.

Respiratory:  Shortness of breath, wheezing, cough, hemoptysis, fever or night 
sweats.

Gastrointestinal:  Poor appetite, abdominal pain, heartburn, nausea, vomiting, 
constipation, or diarrhea.

Genitourinary:  Urgency, frequency, dysuria, nocturia.

Musculoskeletal:  Pain, swelling.

Neurologic/Psychiatric:  Anxiety, depression.

Allergy/Immunologic:  Skin rash, bleeding tendency.

 

PHYSICAL EXAMINATION:

GENERAL:  Patient is a 77-year-old male who keeps moaning, which per family is 
normal for him.  He does not appear to be in any distress.

VITAL SIGNS:  Blood pressure 160/76, pulse 70 per minute, respiratory rate 20 
per minute, temperature 101.2 degrees Fahrenheit, saturating 97% on room air.

NECK:  Supple, no elevated JVD.

HEENT:  Eyes:  Extraocular muscles intact.  Pupils are reacting to light.  Oral 
cavity mucous membranes are dry.  No exudates or congestion.

CARDIOVASCULAR:  S1, S2 heard.  Regular rhythm.

RESPIRATORY:  Air entry 1+ bilateral.

EXTREMITIES:  No ischemic ulcerations or gangrene.

CENTRAL NERVOUS SYSTEM:  Patient has chronic left hemiparesis, but moves all 4 
extremities.

PSYCHIATRIC:  Patient does not have any hallucinations or delusions at present.

 

LABORATORY AND X-RAY FINDINGS:  White count of 4, H&H 11 and 36, platelet count 
209 with 50% neutrophils, there is 9% bands, MCV is 106.  Electrolytes are 
stable.  BUN 15, creatinine 0.9, glucose 113, albumin is 3.3.  Liver enzymes 
within normal limits.  CK level is 202.  Troponin I is 0.05, CK-MB is 1.6.  Flu 
nasal swab is positive for influenza A antigen.  CT brain shows mild 
improvement and they have focal hyperdensity seen in the left lower lobe 
measuring 1 cm is smaller and measures about 7 mm on the current study.  Please 
note, this was in comparison to prior CAT scan done on the 12/18 of this month.
  There is no acute infarct.  CT cervical spine done shows no acute fracture or 
subluxation.  There is multiple spondylosis of the cervical spine.  There is 
diffuse osteopenia seen.

 

CLINICAL IMPRESSION AND PLAN:  Patient will be admitted to telemetry for a 
positive flu for influenza A with fever of 101, generalized weakness, unable to 
eat or drink and confusion at home with likely acute encephalopathy due to 
current illness.  He has also fallen twice at home.  In fact, the patient has 
had multiple falls in the past and he was recently brought to emergency room on 
the 12/18 with supraorbital lacerations with suturing done.  He will be on 
normal saline at 70 mL per hour.  We will also continue him on full dose 
aspirin along with Norvasc, Lipitor, Coreg, folic acid and misoprostol, 
ramipril as before.  He will be on Tamiflu 75 mg twice daily and empiric 
Levaquin 500 mg IV daily.  Pan cultures have been obtained in the ER and will 
follow up on that.  He will be on clear liquid diet for tonight and can be 
advanced as patient becomes more awake.  PT, OT evaluations will be requested.  
Code status was discussed with the family at bedside and patient.  They all 
want him to be a FULL CODE for now.  Patient appears to have chronic troponin 
in indeterminate stage.  His prior echo shows a normal EF of 50% to 55% done in 
October of this year with mild diastolic dysfunction.  His valvular structures 
were also within normal limits then.  Patient can be safely transferred to 
medical floor if he remains stable with no worsening of troponin and if he 
remains chest free to medical floor in the morning.

 

DEJUAN

## 2017-12-31 NOTE — PDOC.PN
- Subjective


Encounter Start Date: 12/31/17


Encounter Start Time: 08:44


Subjective: seen and examined with no new complaint





- Objective


Resuscitation Status: 


 











Resuscitation Status           FULL:Full Resuscitation














Vital Signs & Weight: 


 Vital Signs (12 hours)











  Temp Pulse Resp BP Pulse Ox


 


 12/31/17 08:15  97.7 F  60  20  152/75 H  97


 


 12/31/17 05:00  97.9 F  64  24 H  140/65  97


 


 12/30/17 23:45  98.4 F  71  22 H  115/71  97


 


 12/30/17 21:05  101.1 F H  65  18  150/66 H  96








 Weight











Weight                         156 lb 12.8 oz














Result Diagrams: 


 12/31/17 04:26





 12/31/17 04:26





Phys Exam





- Physical Examination


Constitutional: NAD


HEENT: PERRLA, moist MMs, sclera anicteric, TM's clear


Neck: no nodes, no JVD, supple, full ROM


Respiratory: no wheezing, no rales, no rhonchi, clear to auscultation bilateral


Cardiovascular: RRR, no significant murmur, no rub


Gastrointestinal: soft, non-tender, no distention, positive bowel sounds





Dx/Plan


(1) Influenza A


Code(s): J10.1 - FLU DUE TO OTH IDENT INFLUENZA VIRUS W OTH RESP MANIFEST   

Status: Acute   





(2) Fever


Code(s): R50.9 - FEVER, UNSPECIFIED   Status: Acute   





(3) Hypokalemia


Code(s): E87.6 - HYPOKALEMIA   Status: Acute   





(4) HTN (hypertension)


Code(s): I10 - ESSENTIAL (PRIMARY) HYPERTENSION   Status: Chronic   


Qualifiers: 


 


Comment: stable, Contiue home meds   





(5) History of CVA (cerebrovascular accident)


Code(s): Z86.73 - PRSNL HX OF TIA (TIA), AND CEREB INFRC W/O RESID DEFICITS   

Status: Chronic   





(6) Metabolic encephalopathy


Code(s): G93.41 - METABOLIC ENCEPHALOPATHY   Status: Resolved   Comment: 

present on admit, resolved.   





- Plan


plan discussed w/ family, continue antibiotics, PT/OT, , 

respiratory therapy


Replete potassium





* .

## 2018-01-01 NOTE — PDOC.PN
- Subjective


Encounter Start Date: 01/01/18


Encounter Start Time: 08:30


Subjective: Back to baseline mental status per family. Trouble communicating 

due to 


-: previous stroke. Denies complaints this AM.





- Objective


Resuscitation Status: 


 











Resuscitation Status           FULL:Full Resuscitation














MAR Reviewed: Yes


Vital Signs & Weight: 


 Vital Signs (12 hours)











  Temp Pulse Resp BP Pulse Ox


 


 01/01/18 05:25      97


 


 01/01/18 04:43  98.6 F  63  24 H  156/72 H 


 


 01/01/18 00:00  100.4 F H  64  24 H  154/67 H  97


 


 12/31/17 20:00  97.7 F  62  24 H  174/75 H  96








 Weight











Weight                         156 lb 12.8 oz














I&O: 


 











 12/31/17 01/01/18 01/02/18





 06:59 06:59 06:59


 


Intake Total  1900 


 


Balance  1900 











Result Diagrams: 


 12/31/17 04:26





 01/01/18 04:55





Phys Exam





- Physical Examination


Constitutional: NAD


HEENT: moist MMs


Respiratory: no wheezing, no rales, no rhonchi, clear to auscultation bilateral


Cardiovascular: RRR


Gastrointestinal: soft, positive bowel sounds


Musculoskeletal: no edema, pulses present





Dx/Plan


(1) Influenza A


Code(s): J10.1 - FLU DUE TO OTH IDENT INFLUENZA VIRUS W OTH RESP MANIFEST   

Status: Acute   





(2) Hypokalemia


Code(s): E87.6 - HYPOKALEMIA   Status: Resolved   





(3) HTN (hypertension)


Code(s): I10 - ESSENTIAL (PRIMARY) HYPERTENSION   Status: Chronic   


Qualifiers: 


 


Comment: stable, Contiue home meds   





(4) History of CVA (cerebrovascular accident)


Code(s): Z86.73 - PRSNL HX OF TIA (TIA), AND CEREB INFRC W/O RESID DEFICITS   

Status: Chronic   





(5) Metabolic encephalopathy


Code(s): G93.41 - METABOLIC ENCEPHALOPATHY   Status: Resolved   Comment: 

present on admit, resolved.   





- Plan


cont current plan of care, continue antibiotics, PT/OT, DVT proph w/SCDs


Refusing blood thinners as told by Neurosurgery not to take any this week 


-: before appt on 1/8.





* .








- Discharge Day


Encounter end time: 09:00

## 2018-01-02 NOTE — PDOC.PN
- Subjective


Encounter Start Date: 01/02/18


Encounter Start Time: 09:00


Subjective: Still very weak requiring full assist with PT. No other complaints.





- Objective


Resuscitation Status: 


 











Resuscitation Status           FULL:Full Resuscitation














MAR Reviewed: Yes


Vital Signs & Weight: 


 Vital Signs (12 hours)











  Temp Pulse Resp BP BP Pulse Ox


 


 01/02/18 09:10     147/67 H  


 


 01/02/18 09:09   63   147/67 H  


 


 01/02/18 07:45  97.3 F L  63  16   147/67 H  97


 


 01/02/18 07:20  97.0 F L  62  28 H   


 


 01/02/18 04:05  97.0 F L  62  28 H   118/58 L  96


 


 01/02/18 03:59       98


 


 01/01/18 23:52  98.9 F  59 L  16   117/58 L  98








 Weight











Weight                         156 lb 12.8 oz














I&O: 


 











 01/01/18 01/02/18 01/03/18





 06:59 06:59 06:59


 


Intake Total 1900 1928 


 


Balance 1900 1928 











Result Diagrams: 


 12/31/17 04:26





 01/01/18 04:55





Phys Exam





- Physical Examination


Constitutional: NAD


HEENT: moist MMs


Respiratory: no wheezing, no rales, no rhonchi


Cardiovascular: RRR


Gastrointestinal: soft, positive bowel sounds


Musculoskeletal: no edema


Psychiatric: normal affect, A&O x 3


Deviation from normal: dysarthria





Dx/Plan


(1) Influenza A


Code(s): J10.1 - FLU DUE TO OTH IDENT INFLUENZA VIRUS W OTH RESP MANIFEST   

Status: Acute   





(2) Hypokalemia


Code(s): E87.6 - HYPOKALEMIA   Status: Resolved   





(3) HTN (hypertension)


Code(s): I10 - ESSENTIAL (PRIMARY) HYPERTENSION   Status: Chronic   


Qualifiers: 


 


Comment: stable, Contiue home meds   





(4) History of CVA (cerebrovascular accident)


Code(s): Z86.73 - PRSNL HX OF TIA (TIA), AND CEREB INFRC W/O RESID DEFICITS   

Status: Chronic   





(5) Metabolic encephalopathy


Code(s): G93.41 - METABOLIC ENCEPHALOPATHY   Status: Resolved   Comment: 

present on admit, resolved.   





(6) Sepsis


Code(s): A41.9 - SEPSIS, UNSPECIFIED ORGANISM   Status: Resolved   Comment: 

Patient with fever, suppressed WBC, and some tachypnea in the hospital, along 

with metablic encephalopathy on admit consistent with sepsis from Influenza A. 

Now resolved.   





- Plan


cont current plan of care, PT/OT, 


Patient will need SNF after 3rd midnight.





* .








- Discharge Day


Encounter end time: 09:30

## 2018-01-03 NOTE — PDOC.PN
- Subjective


Encounter Start Date: 01/03/18


Encounter Start Time: 12:00


Subjective: Feeling much better. No more confusion.





- Objective


Resuscitation Status: 


 











Resuscitation Status           FULL:Full Resuscitation














MAR Reviewed: Yes


Vital Signs & Weight: 


 Vital Signs (12 hours)











  Temp Pulse Resp BP Pulse Ox


 


 01/03/18 16:00  98.6 F  60  21 H  152/79 H  94 L


 


 01/03/18 11:45  97.8 F  62  18  158/78 H  98


 


 01/03/18 08:00  97.6 F  60  18  173/80 H  99








 Weight











Weight                         156 lb 12.8 oz














I&O: 


 











 01/02/18 01/03/18 01/04/18





 06:59 06:59 06:59


 


Intake Total 1928 1107 480


 


Balance 1928 1107 480











Result Diagrams: 


 12/31/17 04:26





 01/01/18 04:55





Phys Exam





- Physical Examination


Constitutional: NAD


HEENT: moist MMs


Respiratory: no wheezing, no rales, no rhonchi


Cardiovascular: RRR


Gastrointestinal: soft, positive bowel sounds


left sided weakness


Psychiatric: normal affect, A&O x 3





Dx/Plan


(1) Influenza A


Code(s): J10.1 - FLU DUE TO OTH IDENT INFLUENZA VIRUS W OTH RESP MANIFEST   

Status: Acute   





(2) Hypokalemia


Code(s): E87.6 - HYPOKALEMIA   Status: Resolved   





(3) HTN (hypertension)


Code(s): I10 - ESSENTIAL (PRIMARY) HYPERTENSION   Status: Chronic   


Qualifiers: 


 


Comment: stable, Contiue home meds   





(4) History of CVA (cerebrovascular accident)


Code(s): Z86.73 - PRSNL HX OF TIA (TIA), AND CEREB INFRC W/O RESID DEFICITS   

Status: Chronic   





(5) Metabolic encephalopathy


Code(s): G93.41 - METABOLIC ENCEPHALOPATHY   Status: Resolved   Comment: 

present on admit, resolved.   





(6) Sepsis


Code(s): A41.9 - SEPSIS, UNSPECIFIED ORGANISM   Status: Resolved   Comment: 

Patient with fever, suppressed WBC, and some tachypnea in the hospital, along 

with metablic encephalopathy on admit consistent with sepsis from Influenza A. 

Now resolved.   





- Plan


cont current plan of care


No evidence pneumonia so d/c Levaquin. Continue Tamiflu. Transfer to rehab 


-: when approved.





* .








- Discharge Day


Encounter end time: 12:30

## 2018-01-03 NOTE — PDOC.EVN
Event Note





- Event Note


Event Note: 


Patient with multiple stools today. Not watery or mucoid, more pasty. C. diff 

sent with antigen positive, toxin pending. If toxin positive will treat with 

metronidazole. If toxin neg will check for fecal leukocytes.

## 2018-01-04 NOTE — PDOC.PN
- Subjective


Encounter Start Date: 01/04/18


Encounter Start Time: 10:00


Subjective: no complaints, had about 3 stools overnight





- Objective


Resuscitation Status: 


 











Resuscitation Status           FULL:Full Resuscitation














MAR Reviewed: Yes


Vital Signs & Weight: 


 Vital Signs (12 hours)











  Temp Pulse Resp BP Pulse Ox


 


 01/04/18 08:10  97.9 F  62  16  155/72 H  99


 


 01/04/18 03:51  97.4 F L  61  18  152/73 H  96


 


 01/04/18 00:15  97.7 F  61  16  130/76  96








 Weight











Weight                         156 lb 12.8 oz














I&O: 


 











 01/03/18 01/04/18 01/05/18





 06:59 06:59 06:59


 


Intake Total 1107 720 


 


Balance 1107 720 











Result Diagrams: 


 12/31/17 04:26





 01/01/18 04:55





Phys Exam





- Physical Examination


Constitutional: NAD


HEENT: moist MMs


Respiratory: no wheezing, no rales, no rhonchi


Cardiovascular: RRR, no significant murmur


Gastrointestinal: soft, positive bowel sounds


Musculoskeletal: no edema


Psychiatric: normal affect


Deviation from normal: trouble communicating due to stroke and dysarthria





Dx/Plan


(1) Influenza A


Code(s): J10.1 - FLU DUE TO OTH IDENT INFLUENZA VIRUS W OTH RESP MANIFEST   

Status: Acute   





(2) Hypokalemia


Code(s): E87.6 - HYPOKALEMIA   Status: Resolved   





(3) HTN (hypertension)


Code(s): I10 - ESSENTIAL (PRIMARY) HYPERTENSION   Status: Chronic   


Qualifiers: 


 


Comment: stable, Contiue home meds   





(4) History of CVA (cerebrovascular accident)


Code(s): Z86.73 - PRSNL HX OF TIA (TIA), AND CEREB INFRC W/O RESID DEFICITS   

Status: Chronic   





(5) Metabolic encephalopathy


Code(s): G93.41 - METABOLIC ENCEPHALOPATHY   Status: Resolved   Comment: 

present on admit, resolved.   





(6) Sepsis


Code(s): A41.9 - SEPSIS, UNSPECIFIED ORGANISM   Status: Resolved   Comment: 

Patient with fever, suppressed WBC, and some tachypnea in the hospital, along 

with metablic encephalopathy on admit consistent with sepsis from Influenza A. 

Now resolved.   





(7) C. difficile diarrhea


Code(s): A04.72 - ENTEROCOLITIS D/T CLOSTRIDIUM DIFFICILE, NOT SPCF AS RECUR   

Status: Acute   Comment: mild symptoms, antigen and toxin positive so will 

treat with Vanc 125mg po QID for 10 days.   





- Plan


cont current plan of care, PT/OT, DVT proph w/SCDs


Cleared to d/c to Rehab





* .








- Discharge Day


Encounter end time: 10:25

## 2018-01-05 NOTE — DIS
PRIMARY CARE PHYSICIAN:  Lillian Childers.

 

DIAGNOSES ON ADMISSION:

1.  Influenza A.

2.  Acute encephalopathy.

3.  Multiple falls.

4.  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

5.  Coronary artery disease with chronic indeterminate troponins.

6.  Hypertension.

7.  Dyslipidemia.

8.  Pacemaker.

9.  Dementia.

10.  Urethral stricture.

 

DISCHARGE DIAGNOSES:

1.  Influenza A.

2.  Metabolic encephalopathy, resolved.

3.  Clostridium difficile diarrhea.

4.  Sepsis secondary to influenza A, resolved.

5.  Multiple falls.

6.  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

7.  Coronary artery disease with chronic indeterminate troponins.

8.  Hypertension.

9.  Dyslipidemia.

10.  Pacemaker.

11.  Dementia.

12.  Urethral stricture.

 

PROCEDURES:

1.  CT of the brain showing some actual improvement in previous hygroma, hematoma in the brain, other
wise unchanged.

2.  Cervical spine CT showing no acute fracture or subluxation.

 

CONSULTATIONS:  None.

 

PERTINENT LABORATORY DATA:  White blood cell count 4.3, hemoglobin and hematocrit stable.  No bandemi
a or neutrophilia.  Troponins indeterminate, stable with previous.  Influenza antigen testing positiv
e for influenza A antigen.  C. difficile antigen and toxin both positive.

 

HOSPITAL COURSE:  This is a 77-year-old -American male with a history of previous stroke, dysa
rthria and left-sided weakness.  He easily gets around with a walker and has fallen multiple times pr
ior to coming into the hospital and he developed a fever in the hospital and he was diagnosed with in
fluenza and started on Tamiflu along with Levaquin for possible _____ infection.  Chest x-ray was don
e in the emergency room that did show no infiltrates or evidence of pneumonia and his white cell coun
t remained low, so his Levaquin was eventually discontinued.  The patient did better over the next ho
spital days, but still with some weakness and it was determined that he should go to the rehabilitati
on before going home.  The day before discharge, the patient did have increasing stool frequency, no 
bad smell or mucus or blood in them and so C. diff antigen and toxin were sent since he was on antibi
otics on admission.  The antigen and toxin both came back positive, still having frequent stools, but
 not massive amounts and he has been started on oral vancomycin for a mild C. difficile diarrhea.  Th
e patient has been accepted to [facility] where he is being transferred to rehab today.

 

DISCHARGE MANAGEMENT:  Discharged to [facility].

 

DISCHARGE MEDICATIONS:

1.  Alphanavar 75 mg twice a day to complete a 5-day course.

2.  Vancomycin 125 mg 4 times a day for 10 days.

3.  Ramipril 10 mg daily.

4.  Cytotec 200 mcg 3 times a day.

5.  Methotrexate 2.5 mg every 7 days.

6.  Folic acid 1 mg daily.

7.  Diflucan 100 mg daily.

8.  Voltaren 3 times a day.

9.  Carvedilol 25 mg twice a day.

10.  Atorvastatin 10 mg at night.

11.  Aspirin 325 mg daily.

12.  Amlodipine 5 mg daily.

13.  Senokot as needed.

14.  Robitussin DM 15 mL q.4 hours as needed for coughing congestion.

15.  Pepcid 20 mg twice a day.

16.  Tylenol as needed.

 

FOLLOWUP:  Patient is to follow up with his primary care physician, Dr. Lillian Childers in a couple of
 weeks.

 

ACTIVITIES:  As tolerated.

 

DIET:  Healthy heart, low sodium diet.

 

DISCHARGE PLAN:  He is to get occupational, physical and speech therapy.  He does need mechanical sof
t texture solids and nectar thick liquids per Speech Therapy.

## 2018-01-06 NOTE — EKG
Test Reason : 

Blood Pressure : ***/*** mmHG

Vent. Rate : 061 BPM     Atrial Rate : 061 BPM

   P-R Int : 092 ms          QRS Dur : 188 ms

    QT Int : 446 ms       P-R-T Axes : -23 -86 093 degrees

   QTc Int : 448 ms

 

AV sequential or dual chamber electronic pacemaker

 

Confirmed by LUNA MORALES ([id number]),  SAL BROWN ([id number]) on 1/6/2018 3:27:36 PM

 

Referred By:             Confirmed By:LUNA MORALES

## 2018-02-13 ENCOUNTER — HOSPITAL ENCOUNTER (EMERGENCY)
Dept: HOSPITAL 92 - ERS | Age: 78
Discharge: HOME | End: 2018-02-13
Payer: MEDICARE

## 2018-02-13 DIAGNOSIS — M25.511: Primary | ICD-10-CM

## 2018-02-13 DIAGNOSIS — Z79.82: ICD-10-CM
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
## 2018-02-13 DIAGNOSIS — Z79.899: ICD-10-CM

## 2018-02-13 DIAGNOSIS — R53.1: ICD-10-CM

## 2018-02-13 DIAGNOSIS — M25.551: ICD-10-CM

## 2018-02-13 DIAGNOSIS — I49.9: ICD-10-CM

## 2018-02-13 DIAGNOSIS — I10: ICD-10-CM

## 2018-02-13 DIAGNOSIS — M25.561: ICD-10-CM

## 2018-02-13 PROCEDURE — 72170 X-RAY EXAM OF PELVIS: CPT

## 2018-02-13 PROCEDURE — 70450 CT HEAD/BRAIN W/O DYE: CPT

## 2018-02-13 PROCEDURE — 71045 X-RAY EXAM CHEST 1 VIEW: CPT

## 2018-02-13 NOTE — RAD
PORTABLE CHEST ONE VIEW:

 

Date: 2-13-18 

Time: 1:06 p.m.

 

History: Fall, right shoulder pain. 

 

FINDINGS: 

Comparison is made with exam of 12-30-17. 

 

The heart size is normal. The right sided pacemaker remains in place. The lungs are well expanded wit
hout focal areas of consolidation, pneumothorax, or pleural effusion. There is degenerative change in
 the shoulder joint. 

 

IMPRESSION: 

No radiographic evidence of acute cardiopulmonary process.  

 

POS: [facility]

## 2018-02-13 NOTE — CT
CT OF THE BRAIN WITHOUT CONTRAST:

 

COMPARISON: 

12/03/17.

 

HISTORY: 

Fall.  The patient was found down by his daughter, whom he lives with, at 4:00 in the morning; head t
rauma.

 

TECHNIQUE: 

Multiple contiguous axial images were obtained in a CT of the brain without contrast.

 

FINDINGS: 

There are scattered hypodensities in the subcortical and periventricular white matter, likely seconda
ry to small-vessel ischemic disease.  No large confluent infarction is seen.  There is no evidence of
 hydrocephalus, intracranial hemorrhage, or extraaxial fluid collection.

 

The calvarium and overlying soft tissues are unremarkable.  The visualized paranasal sinuses and mast
oid air cells are well aerated.

 

IMPRESSION: 

No evidence of acute intracranial abnormality.

 

POS: [facility]

## 2018-02-13 NOTE — RAD
SINGLE VIEW OF THE PELVIS:

 

Comparison: 12-10-17

 

History: Patient was found down at 4:00 a.m. by the daughter after a fall. Pelvic pain. 

 

FINDINGS: 

Single view of the pelvis shows no evidence of acute fracture or dislocation. No significant degenera
tive changes seen in either hip. There are moderate degenerative changes of the lumbar spine. There i
s stable remodeling of the superior and inferior pubic rami. 

 

IMPRESSION: 

No evidence of acute osseous abnormality. 

 

POS: [facility]

## 2018-03-10 ENCOUNTER — HOSPITAL ENCOUNTER (EMERGENCY)
Dept: HOSPITAL 92 - ERS | Age: 78
Discharge: HOME | End: 2018-03-10
Payer: MEDICARE

## 2018-03-10 DIAGNOSIS — I10: ICD-10-CM
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
## 2018-03-10 DIAGNOSIS — S30.0XXA: Primary | ICD-10-CM

## 2018-03-10 DIAGNOSIS — K59.00: ICD-10-CM

## 2018-03-10 LAB
ANION GAP SERPL CALC-SCNC: 12 MMOL/L (ref 10–20)
BUN SERPL-MCNC: 21 MG/DL (ref 8.4–25.7)
CALCIUM SERPL-MCNC: 9.3 MG/DL (ref 7.8–10.44)
CHLORIDE SERPL-SCNC: 107 MMOL/L (ref 98–107)
CK SERPL-CCNC: 515 U/L (ref 30–200)
CO2 SERPL-SCNC: 25 MMOL/L (ref 23–31)
CREAT CL PREDICTED SERPL C-G-VRATE: 0 ML/MIN (ref 70–130)
GLUCOSE SERPL-MCNC: 115 MG/DL (ref 83–110)
POTASSIUM SERPL-SCNC: 3.7 MMOL/L (ref 3.5–5.1)
SODIUM SERPL-SCNC: 140 MMOL/L (ref 136–145)

## 2018-03-10 PROCEDURE — 72100 X-RAY EXAM L-S SPINE 2/3 VWS: CPT

## 2018-03-10 PROCEDURE — 36415 COLL VENOUS BLD VENIPUNCTURE: CPT

## 2018-03-10 PROCEDURE — 72170 X-RAY EXAM OF PELVIS: CPT

## 2018-03-10 PROCEDURE — 80048 BASIC METABOLIC PNL TOTAL CA: CPT

## 2018-03-10 PROCEDURE — 82550 ASSAY OF CK (CPK): CPT

## 2018-03-10 NOTE — RAD
AP PELVIS

3/10/18

 

HISTORY: 

Fall. Bilateral hip pain. 

 

FINDINGS/IMPRESSION:  

Comparison made with exam of 2/13/18. 

 

Degenerative changes are present in the hip and SI joints. No acute fracture or dislocation is identi
fied. No significant interval changes seen since 2/13/18. There is stable remodeling of the superior 
and inferior pubic rami. 

 

POS: [facility]

## 2018-03-10 NOTE — RAD
LUMBAR SPINE THREE VIEWS

3/10/18

 

HISTORY: 

Trauma, fall, hip pain, low back pain.

 

FINDINGS/IMPRESSION:  

Degenerative changes are present. There is grade I anterolisthesis of L4 over L5 vertebral bodies. No
 compression fracture is identified. 

 

POS: [facility]

## 2018-03-12 ENCOUNTER — HOSPITAL ENCOUNTER (EMERGENCY)
Dept: HOSPITAL 92 - ERS | Age: 78
Discharge: HOME | End: 2018-03-12
Payer: MEDICARE

## 2018-03-12 DIAGNOSIS — Z86.73: ICD-10-CM

## 2018-03-12 DIAGNOSIS — I10: ICD-10-CM

## 2018-03-12 DIAGNOSIS — R10.31: Primary | ICD-10-CM

## 2018-03-12 DIAGNOSIS — Z79.899: ICD-10-CM

## 2018-03-12 LAB
ANION GAP SERPL CALC-SCNC: 8 MMOL/L (ref 10–20)
BASOPHILS # BLD AUTO: 0.1 THOU/UL (ref 0–0.2)
BASOPHILS NFR BLD AUTO: 1.2 % (ref 0–1)
BUN SERPL-MCNC: 20 MG/DL (ref 8.4–25.7)
CALCIUM SERPL-MCNC: 9.3 MG/DL (ref 7.8–10.44)
CHLORIDE SERPL-SCNC: 106 MMOL/L (ref 98–107)
CO2 SERPL-SCNC: 32 MMOL/L (ref 23–31)
CREAT CL PREDICTED SERPL C-G-VRATE: 0 ML/MIN (ref 70–130)
CRYSTAL-AUWI FLAG: 0 (ref 0–15)
EOSINOPHIL # BLD AUTO: 0.1 THOU/UL (ref 0–0.7)
EOSINOPHIL NFR BLD AUTO: 1 % (ref 0–10)
GLUCOSE SERPL-MCNC: 110 MG/DL (ref 83–110)
HEV IGM SER QL: 8.8 (ref 0–7.99)
HGB BLD-MCNC: 11.6 G/DL (ref 14–18)
HYALINE CASTS #/AREA URNS LPF: (no result) LPF
LIPASE SERPL-CCNC: 14 U/L (ref 8–78)
LYMPHOCYTES # BLD: 1.7 THOU/UL (ref 1.2–3.4)
LYMPHOCYTES NFR BLD AUTO: 33.1 % (ref 21–51)
MCH RBC QN AUTO: 33.9 PG (ref 27–31)
MCV RBC AUTO: 104 FL (ref 80–94)
MONOCYTES # BLD AUTO: 0.7 THOU/UL (ref 0.11–0.59)
MONOCYTES NFR BLD AUTO: 14.1 % (ref 0–10)
NEUTROPHILS # BLD AUTO: 2.7 THOU/UL (ref 1.4–6.5)
NEUTROPHILS NFR BLD AUTO: 50.6 % (ref 42–75)
PATHC CAST-AUWI FLAG: 0.13 (ref 0–2.49)
PLATELET # BLD AUTO: 263 THOU/UL (ref 130–400)
POTASSIUM SERPL-SCNC: 3.8 MMOL/L (ref 3.5–5.1)
RBC # BLD AUTO: 3.42 MILL/UL (ref 4.7–6.1)
RBC UR QL AUTO: (no result) HPF (ref 0–3)
SODIUM SERPL-SCNC: 142 MMOL/L (ref 136–145)
SP GR UR STRIP: 1.02 (ref 1–1.04)
SPERM-AUWI FLAG: 0 (ref 0–9.9)
WBC # BLD AUTO: 5.2 THOU/UL (ref 4.8–10.8)
WBC UR QL AUTO: (no result) HPF (ref 0–3)
YEAST-AUWI FLAG: 0 (ref 0–25)

## 2018-03-12 PROCEDURE — 74176 CT ABD & PELVIS W/O CONTRAST: CPT

## 2018-03-12 PROCEDURE — 85025 COMPLETE CBC W/AUTO DIFF WBC: CPT

## 2018-03-12 PROCEDURE — 36415 COLL VENOUS BLD VENIPUNCTURE: CPT

## 2018-03-12 PROCEDURE — 83690 ASSAY OF LIPASE: CPT

## 2018-03-12 PROCEDURE — 51701 INSERT BLADDER CATHETER: CPT

## 2018-03-12 PROCEDURE — 81003 URINALYSIS AUTO W/O SCOPE: CPT

## 2018-03-12 PROCEDURE — 80048 BASIC METABOLIC PNL TOTAL CA: CPT

## 2018-03-12 PROCEDURE — 81015 MICROSCOPIC EXAM OF URINE: CPT

## 2018-03-12 NOTE — CT
NONCONTRAST ENHANCED CT IMAGES ABDOMEN AND PELVIS:

 

HISTORY: 

A 77-year-old male with a history of lower abdominal pain.

 

FINDINGS: 

Noncontrast enhanced CT images of the abdomen and pelvis obtained.  Iv and oral contrast was not give
n. 

 

The lung bases are unremarkable.  

 

No evidence of free intraperitoneal air is seen.  

 

The patient has an intracardiac pacing device.

 

The liver and spleen are unremarkable.  The pancreas is unremarkable.  Adrenal gland is unremarkable.


 

The right kidney contains cortical cysts.  Other hypodense areas are seen in the left kidney most com
patible with left-sided renal cysts or cystic lesions.  I cannot exclude the possibility of renal pat
hology including malignancy.  No evidence of periaortic lymphadenopathy is seen.  Motion artifact is 
seen which does decrease the sensitivity for detection of pathology in the abdomen and pelvis.

 

Multilevel lumbar degenerative change is seen.  Areas of old trauma are seen in the pelvis with fused
 right and left SI joints.

 

No dilated loops of small bowel seen.

 

IMPRESSION: 

Multiple hypodense areas seen in the kidneys possibly representing renal cysts or cystic masses.  Cor
relate with urological consultation as clinically indicated.

 

POS: [facility]

## 2018-03-14 ENCOUNTER — HOSPITAL ENCOUNTER (EMERGENCY)
Dept: HOSPITAL 92 - ERS | Age: 78
Discharge: HOME | End: 2018-03-14
Payer: MEDICARE

## 2018-03-14 DIAGNOSIS — Z79.899: ICD-10-CM

## 2018-03-14 DIAGNOSIS — I10: ICD-10-CM

## 2018-03-14 DIAGNOSIS — M79.81: Primary | ICD-10-CM

## 2018-03-14 LAB
ALBUMIN SERPL BCG-MCNC: 3.6 G/DL (ref 3.4–4.8)
ALP SERPL-CCNC: 67 U/L (ref 40–150)
ALT SERPL W P-5'-P-CCNC: 29 U/L (ref 8–55)
ANION GAP SERPL CALC-SCNC: 15 MMOL/L (ref 10–20)
APTT PPP: 31.8 SEC (ref 22.9–36.1)
AST SERPL-CCNC: 38 U/L (ref 5–34)
BASOPHILS # BLD AUTO: 0 THOU/UL (ref 0–0.2)
BASOPHILS NFR BLD AUTO: 0.6 % (ref 0–1)
BILIRUB SERPL-MCNC: 0.4 MG/DL (ref 0.2–1.2)
BUN SERPL-MCNC: 19 MG/DL (ref 8.4–25.7)
CALCIUM SERPL-MCNC: 9.5 MG/DL (ref 7.8–10.44)
CHLORIDE SERPL-SCNC: 103 MMOL/L (ref 98–107)
CO2 SERPL-SCNC: 23 MMOL/L (ref 23–31)
CREAT CL PREDICTED SERPL C-G-VRATE: 0 ML/MIN (ref 70–130)
EOSINOPHIL # BLD AUTO: 0 THOU/UL (ref 0–0.7)
EOSINOPHIL NFR BLD AUTO: 0.8 % (ref 0–10)
GLOBULIN SER CALC-MCNC: 3.6 G/DL (ref 2.4–3.5)
GLUCOSE SERPL-MCNC: 152 MG/DL (ref 83–110)
HGB BLD-MCNC: 12.4 G/DL (ref 14–18)
INR PPP: 1.1
LIPASE SERPL-CCNC: 9 U/L (ref 8–78)
LYMPHOCYTES # BLD: 1.1 THOU/UL (ref 1.2–3.4)
LYMPHOCYTES NFR BLD AUTO: 18.7 % (ref 21–51)
MACROCYTES BLD QL SMEAR: (no result) (100X)
MCH RBC QN AUTO: 33.8 PG (ref 27–31)
MCV RBC AUTO: 109 FL (ref 80–94)
MONOCYTES # BLD AUTO: 0.7 THOU/UL (ref 0.11–0.59)
MONOCYTES NFR BLD AUTO: 13 % (ref 0–10)
NEUTROPHILS # BLD AUTO: 3.8 THOU/UL (ref 1.4–6.5)
NEUTROPHILS NFR BLD AUTO: 66.9 % (ref 42–75)
PLATELET # BLD AUTO: 253 THOU/UL (ref 130–400)
POTASSIUM SERPL-SCNC: 4.3 MMOL/L (ref 3.5–5.1)
PROTHROMBIN TIME: 14.8 SEC (ref 12–14.7)
RBC # BLD AUTO: 3.67 MILL/UL (ref 4.7–6.1)
SODIUM SERPL-SCNC: 137 MMOL/L (ref 136–145)
SP GR UR STRIP: 1.03 (ref 1–1.04)
WBC # BLD AUTO: 5.7 THOU/UL (ref 4.8–10.8)

## 2018-03-14 PROCEDURE — 83690 ASSAY OF LIPASE: CPT

## 2018-03-14 PROCEDURE — 85730 THROMBOPLASTIN TIME PARTIAL: CPT

## 2018-03-14 PROCEDURE — S0028 INJECTION, FAMOTIDINE, 20 MG: HCPCS

## 2018-03-14 PROCEDURE — 80053 COMPREHEN METABOLIC PANEL: CPT

## 2018-03-14 PROCEDURE — 96374 THER/PROPH/DIAG INJ IV PUSH: CPT

## 2018-03-14 PROCEDURE — 74177 CT ABD & PELVIS W/CONTRAST: CPT

## 2018-03-14 PROCEDURE — 51701 INSERT BLADDER CATHETER: CPT

## 2018-03-14 PROCEDURE — 96375 TX/PRO/DX INJ NEW DRUG ADDON: CPT

## 2018-03-14 PROCEDURE — 85610 PROTHROMBIN TIME: CPT

## 2018-03-14 PROCEDURE — 85025 COMPLETE CBC W/AUTO DIFF WBC: CPT

## 2018-03-14 PROCEDURE — 36415 COLL VENOUS BLD VENIPUNCTURE: CPT

## 2018-03-14 PROCEDURE — 81003 URINALYSIS AUTO W/O SCOPE: CPT

## 2018-03-14 NOTE — CT
ABDOMEN AND PELVIC CT SCAN WITH IV CONTRAST:

 

History: 

72-year-old male with history of hip pain and abdominal pain, lower abdominal pain. 

 

Comparison: 

3-12-18, 3-7-13

 

FINDINGS: 

There are some subtle new alveolar parenchymal changes in the left lower lobe, possibly some very min
imal or early pneumonitis or subsegmental atelectasis. There is some motion artifact. The visualized 
liver, gallbladder, pancreas, spleen, and adrenal glands are unremarkable. Multiple bilateral renal c
ysts up to 4 cm. There is lumbar spondylosis with some anterolisthesis of L4 and L5 and marked canal 
and lateral recess stenosis. There is some abnormal thickening and heterogeneous attenuation within t
he inferior right rectus muscle with an appearance which is concerning for the possibility of a rectu
s muscle hematoma. There is some diffuse subcutaneous fat stranding of the lower abdomen and overlyin
g both hip joints, possibly related to some developing or progressive anasarca. Evidence for prior le
ft inguinal hernia repair. There is some minimal fat stranding in the anterior right pelvis adjacent 
to the enlarged right rectus muscle. 

 

IMPRESSION: 

Some abnormal thickening and heterogeneous attenuation associated with the inferior right rectus musc
le with some minimal adjacent fat stranding noted in the soft tissues overlying the right rectus musc
le and also posterior to the right rectus muscle somewhat between the right rectus muscle and the cisco
dder. The etiology of this infiltrative process is indeterminate but I would favor the possibility of
 this representing a hematoma to be the most likely etiology certainly much more likely than some typ
e of neoplastic or infectious process. Correlate clinically with regards to associated bruising. Lumb
ar spondylosis with anterolisthesis of L4 on L5 and canal stenosis. Multiple bilateral renal cysts. E
xam is somewhat limited by motion artifact. Post op left inguinal hernia repair changes. 

 

POS: [facility]

## 2018-03-21 ENCOUNTER — HOSPITAL ENCOUNTER (EMERGENCY)
Dept: HOSPITAL 92 - ERS | Age: 78
Discharge: HOME | End: 2018-03-21
Payer: MEDICARE

## 2018-03-21 DIAGNOSIS — S00.83XA: ICD-10-CM

## 2018-03-21 DIAGNOSIS — I10: ICD-10-CM

## 2018-03-21 DIAGNOSIS — S01.112A: Primary | ICD-10-CM
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
## 2018-03-21 DIAGNOSIS — Z79.899: ICD-10-CM

## 2018-03-21 LAB
CRYSTAL-AUWI FLAG: 0 (ref 0–15)
HEV IGM SER QL: 3.1 (ref 0–7.99)
HYALINE CASTS #/AREA URNS LPF: (no result) LPF
PATHC CAST-AUWI FLAG: 0.14 (ref 0–2.49)
RBC UR QL AUTO: (no result) HPF (ref 0–3)
SP GR UR STRIP: 1.02 (ref 1–1.04)
SPERM-AUWI FLAG: 0 (ref 0–9.9)
WBC UR QL AUTO: (no result) HPF (ref 0–3)
YEAST-AUWI FLAG: 0 (ref 0–25)

## 2018-03-21 PROCEDURE — 81003 URINALYSIS AUTO W/O SCOPE: CPT

## 2018-03-21 PROCEDURE — 81015 MICROSCOPIC EXAM OF URINE: CPT

## 2018-03-21 PROCEDURE — 72170 X-RAY EXAM OF PELVIS: CPT

## 2018-03-21 PROCEDURE — 72125 CT NECK SPINE W/O DYE: CPT

## 2018-03-21 PROCEDURE — 87086 URINE CULTURE/COLONY COUNT: CPT

## 2018-03-21 PROCEDURE — 12013 RPR F/E/E/N/L/M 2.6-5.0 CM: CPT

## 2018-03-21 PROCEDURE — 70450 CT HEAD/BRAIN W/O DYE: CPT

## 2018-03-21 NOTE — CT
CT BRAIN NONCONTRAST:

3/21/18

 

HISTORY:

77-year-old male status post acute head trauma from fall.

 

FINDINGS:

There is no midline shift or any other mass effect.  There is no evidence of acute intracranial hemor
rhage, large cortical infarct, obstructive hydrocephalus, or extraaxial fluid collection.  The calvar
ium is intact.  There is diffuse parenchymal volume loss.  There are low attenuation areas in the whi
te matter.  These are nonspecific, but in a patient of this age, they are probably chronic ischemic w
magdalene matter changes due to microvascular atherosclerosis. There is no interval change since 2/13/18.

 

IMPRESSION: 

 

1) No acute intracranial findings.

 

2) Involutional changes and chronic ischemic white matter changes.

 

 

rodolfo []

 

POS: [facility]

## 2018-03-21 NOTE — RAD
RADIOGRAPH LEFT ELBOW 4 VIEWS:

3/21/18

 

HISTORY: 

77-year-old male with acute traumatic left elbow pain from fall.

 

FINDINGS:  

There is no dislocation. No fracture is identified. 

 

IMPRESSION:  

Negative. 

 

POS: [facility]

## 2018-03-21 NOTE — CT
CT CERVICAL SPINE NONCONTRAST:

3/21/18

 

HISTORY: 

77-year-old male status post acute cervical trauma from fall. 

 

FINDINGS:

There are no jumped or perched facets.  There is no evidence of acute fracture.  The vertebral body h
eights are maintained.  There is no prevertebral soft tissue swelling.  There are degenerative disc c
hanges and facet osteoarthrosis. 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
e left, and extends to the upper mediastinum. This was present on a previous chest CT of 4/28/03. The
re is edema throughout the larynx. 

 

IMPRESSION:

1) Cervical spondylosis.

2) No evidence of acute fracture or acute traumatic subluxation.

3) Right substernal goiter. 

 

rodolfo []

 

POS: [facility]

## 2018-03-21 NOTE — RAD
RADIOGRAPH PELVIS 1 VIEW:

 

DATE:

3/21/18

 

TIME:

6:44 p.m.

 

HISTORY: 

77-year-old male status post acute traumatic injury to the pelvis from fall. 

 

COMPARISON:  

2/13/18.

 

FINDINGS:   

No evidence for fracture or dislocation. If symptoms do not improve by next week, consideration shoul
d be given to followup with noncontrast pelvic CT to evaluate for occult fracture (patient has a pace
maker which would preclude MRI). There are large enthesophytes at the periphery of the bilateral olga
c wings, and there are hypertrophic changes at the lower lumbar spine. There is no interval change ov
erall. 

 

IMPRESSION:  

1.      No evidence for fracture. 

2.      Evidence for diffuse idiopathic skeletal hyperostosis (DISH).

 

POS: [facility]

## 2018-03-21 NOTE — RAD
RADIOGRAPH RIGHT ELBOW 2 VIEWS:

3/21/18

 

HISTORY: 

77-year-old male status post acute traumatic injury to the elbow from fall.

 

FINDINGS:

The study actually consists of four images, but according to the x-ray technologist, only a two view 
was charged because the wrong elbow was imaged initially. The patient's pain is actually in the contr
alateral left elbow.

 

FINDINGS:  

There is no fracture or dislocation. 

 

IMPRESSION:  

Negative. 

 

POS: [facility]

## 2018-04-05 ENCOUNTER — HOSPITAL ENCOUNTER (EMERGENCY)
Dept: HOSPITAL 92 - ERS | Age: 78
Discharge: HOME | End: 2018-04-05
Payer: MEDICARE

## 2018-04-05 DIAGNOSIS — Z79.899: ICD-10-CM
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
## 2018-04-05 DIAGNOSIS — S01.81XA: Primary | ICD-10-CM

## 2018-04-05 DIAGNOSIS — I10: ICD-10-CM

## 2018-04-05 LAB
ANION GAP SERPL CALC-SCNC: 9 MMOL/L (ref 10–20)
BASOPHILS # BLD AUTO: 0 THOU/UL (ref 0–0.2)
BASOPHILS NFR BLD AUTO: 0.5 % (ref 0–1)
BUN SERPL-MCNC: 23 MG/DL (ref 8.4–25.7)
CALCIUM SERPL-MCNC: 9.4 MG/DL (ref 7.8–10.44)
CHLORIDE SERPL-SCNC: 104 MMOL/L (ref 98–107)
CO2 SERPL-SCNC: 30 MMOL/L (ref 23–31)
CREAT CL PREDICTED SERPL C-G-VRATE: 0 ML/MIN (ref 70–130)
EOSINOPHIL # BLD AUTO: 0 THOU/UL (ref 0–0.7)
EOSINOPHIL NFR BLD AUTO: 0.9 % (ref 0–10)
GLUCOSE SERPL-MCNC: 85 MG/DL (ref 83–110)
HGB BLD-MCNC: 11.7 G/DL (ref 14–18)
LYMPHOCYTES # BLD: 1.6 THOU/UL (ref 1.2–3.4)
LYMPHOCYTES NFR BLD AUTO: 36.5 % (ref 21–51)
MCH RBC QN AUTO: 34.3 PG (ref 27–31)
MCV RBC AUTO: 106 FL (ref 80–94)
MONOCYTES # BLD AUTO: 0.5 THOU/UL (ref 0.11–0.59)
MONOCYTES NFR BLD AUTO: 12.2 % (ref 0–10)
NEUTROPHILS # BLD AUTO: 2.1 THOU/UL (ref 1.4–6.5)
NEUTROPHILS NFR BLD AUTO: 49.9 % (ref 42–75)
PLATELET # BLD AUTO: 260 THOU/UL (ref 130–400)
POTASSIUM SERPL-SCNC: 4.1 MMOL/L (ref 3.5–5.1)
RBC # BLD AUTO: 3.41 MILL/UL (ref 4.7–6.1)
SODIUM SERPL-SCNC: 139 MMOL/L (ref 136–145)
SP GR UR STRIP: 1.02 (ref 1–1.04)
WBC # BLD AUTO: 4.3 THOU/UL (ref 4.8–10.8)

## 2018-04-05 PROCEDURE — 80048 BASIC METABOLIC PNL TOTAL CA: CPT

## 2018-04-05 PROCEDURE — 85025 COMPLETE CBC W/AUTO DIFF WBC: CPT

## 2018-04-05 PROCEDURE — 93005 ELECTROCARDIOGRAM TRACING: CPT

## 2018-04-05 PROCEDURE — 36415 COLL VENOUS BLD VENIPUNCTURE: CPT

## 2018-04-05 PROCEDURE — 72125 CT NECK SPINE W/O DYE: CPT

## 2018-04-05 PROCEDURE — 12011 RPR F/E/E/N/L/M 2.5 CM/<: CPT

## 2018-04-05 PROCEDURE — 70450 CT HEAD/BRAIN W/O DYE: CPT

## 2018-04-05 PROCEDURE — 81003 URINALYSIS AUTO W/O SCOPE: CPT

## 2018-04-05 NOTE — CT
CT HEAD WITHOUT CONTRAST:

 

Date:  04/05/18 

 

Multiple axial tomograms obtained through the head without IV enhancement. 

 

HISTORY:  

Patient fell. Injury to head with scalp hematoma. 

 

COMPARISON:  

03/21/18. 

 

FINDINGS:

Mild cortical atrophy again noted. Moderate chronic ischemic white matter change again noted. No intr
acranial hemorrhage, mass, or infarct. There is a scalp hematoma over the right frontal bone. No evid
ence of skull fracture. 

 

IMPRESSION: 

Scalp hematoma of right frontal bone. No evidence of acute intracranial process. 

 

 

 

POS: [facility]

## 2018-04-05 NOTE — CT
CT CERVICAL SPINE:

 

Technique: Multiple axial tomograms were obtained through the cervical spine without IV contrast. 

 

Indication: Patient fell, injury to neck. Trauma. Neck pain. 

 

FINDINGS: 

There are moderate degenerative changes of the cervical spine. Loss of disc space at all levels. Prom
inent hypertrophic changes seen at C1-2. No evidence of acute fracture. The right lobe of the thyroid
 is enlarged and heterogeneous. This was described on a prior CT.

 

IMPRESSION: 

1. Degenerative changes of the cervical spine. No evidence of acute fracture. 

2. Enlarged heterogeneous thyroid, especially the right lobe.

 

 

 

POS: [facility]

## 2018-04-09 NOTE — EKG
Test Reason : 

Blood Pressure : ***/*** mmHG

Vent. Rate : 061 BPM     Atrial Rate : 061 BPM

   P-R Int : 186 ms          QRS Dur : 180 ms

    QT Int : 474 ms       P-R-T Axes : -10 -86 089 degrees

   QTc Int : 477 ms

 

AV dual-paced rhythm

No STEMI

Abnormal ECG

 

Confirmed by CHIKI CHARLTON, SOILA ([id number]),  KIRTI MEJIA ([id number]) on 4/9/2018 4:10:55 PM

 

Referred By:             Confirmed By:SOILA MONET M.D.

## 2018-06-13 ENCOUNTER — HOSPITAL ENCOUNTER (INPATIENT)
Dept: HOSPITAL 92 - ERS | Age: 78
LOS: 2 days | Discharge: NURSING, REHAB OR LONG-TERM CARE | DRG: 83 | End: 2018-06-15
Attending: NEUROLOGICAL SURGERY | Admitting: NEUROLOGICAL SURGERY
Payer: MEDICARE

## 2018-06-13 VITALS — BODY MASS INDEX: 25.9 KG/M2

## 2018-06-13 DIAGNOSIS — R29.6: ICD-10-CM

## 2018-06-13 DIAGNOSIS — I10: ICD-10-CM

## 2018-06-13 DIAGNOSIS — S06.5X9A: Primary | ICD-10-CM
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
## 2018-06-13 DIAGNOSIS — I69.354: ICD-10-CM

## 2018-06-13 DIAGNOSIS — Z95.0: ICD-10-CM

## 2018-06-13 LAB
ALBUMIN SERPL BCG-MCNC: 3.6 G/DL (ref 3.4–4.8)
ALP SERPL-CCNC: 67 U/L (ref 40–150)
ALT SERPL W P-5'-P-CCNC: 12 U/L (ref 8–55)
ANION GAP SERPL CALC-SCNC: 11 MMOL/L (ref 10–20)
APTT PPP: 32.8 SEC (ref 22.9–36.1)
AST SERPL-CCNC: 18 U/L (ref 5–34)
BILIRUB SERPL-MCNC: 0.4 MG/DL (ref 0.2–1.2)
BUN SERPL-MCNC: 23 MG/DL (ref 8.4–25.7)
CALCIUM SERPL-MCNC: 9.6 MG/DL (ref 7.8–10.44)
CHLORIDE SERPL-SCNC: 105 MMOL/L (ref 98–107)
CK MB SERPL-MCNC: 4.2 NG/ML (ref 0–6.6)
CO2 SERPL-SCNC: 29 MMOL/L (ref 23–31)
CREAT CL PREDICTED SERPL C-G-VRATE: 0 ML/MIN (ref 70–130)
CRYSTAL-AUWI FLAG: 0.7 (ref 0–15)
GLOBULIN SER CALC-MCNC: 3.4 G/DL (ref 2.4–3.5)
GLUCOSE SERPL-MCNC: 85 MG/DL (ref 83–110)
HEV IGM SER QL: 12.7 (ref 0–7.99)
HGB BLD-MCNC: 11.5 G/DL (ref 14–18)
INR PPP: 1.1
MCH RBC QN AUTO: 33.6 PG (ref 27–31)
MCV RBC AUTO: 103 FL (ref 80–94)
MDIFF COMPLETE?: YES
PATHC CAST-AUWI FLAG: 2.9 (ref 0–2.49)
PLATELET # BLD AUTO: 276 THOU/UL (ref 130–400)
PLATELET BLD QL SMEAR: (no result)
POTASSIUM SERPL-SCNC: 4.3 MMOL/L (ref 3.5–5.1)
PROTHROMBIN TIME: 14.4 SEC (ref 12–14.7)
RBC # BLD AUTO: 3.43 MILL/UL (ref 4.7–6.1)
RBC UR QL AUTO: (no result) HPF (ref 0–3)
SODIUM SERPL-SCNC: 141 MMOL/L (ref 136–145)
SP GR UR STRIP: 1.02 (ref 1–1.04)
SPERM-AUWI FLAG: 0 (ref 0–9.9)
TROPONIN I SERPL DL<=0.01 NG/ML-MCNC: 0.03 NG/ML (ref ?–0.03)
WBC # BLD AUTO: 5 THOU/UL (ref 4.8–10.8)
WBC UR QL AUTO: (no result) HPF (ref 0–3)
YEAST-AUWI FLAG: 0 (ref 0–25)

## 2018-06-13 PROCEDURE — 80053 COMPREHEN METABOLIC PANEL: CPT

## 2018-06-13 PROCEDURE — 85025 COMPLETE CBC W/AUTO DIFF WBC: CPT

## 2018-06-13 PROCEDURE — 93005 ELECTROCARDIOGRAM TRACING: CPT

## 2018-06-13 PROCEDURE — 72170 X-RAY EXAM OF PELVIS: CPT

## 2018-06-13 PROCEDURE — 82553 CREATINE MB FRACTION: CPT

## 2018-06-13 PROCEDURE — 72125 CT NECK SPINE W/O DYE: CPT

## 2018-06-13 PROCEDURE — 70450 CT HEAD/BRAIN W/O DYE: CPT

## 2018-06-13 PROCEDURE — 80048 BASIC METABOLIC PNL TOTAL CA: CPT

## 2018-06-13 PROCEDURE — 85610 PROTHROMBIN TIME: CPT

## 2018-06-13 PROCEDURE — 51701 INSERT BLADDER CATHETER: CPT

## 2018-06-13 PROCEDURE — 71045 X-RAY EXAM CHEST 1 VIEW: CPT

## 2018-06-13 PROCEDURE — 85730 THROMBOPLASTIN TIME PARTIAL: CPT

## 2018-06-13 PROCEDURE — 81015 MICROSCOPIC EXAM OF URINE: CPT

## 2018-06-13 PROCEDURE — 36415 COLL VENOUS BLD VENIPUNCTURE: CPT

## 2018-06-13 PROCEDURE — 81003 URINALYSIS AUTO W/O SCOPE: CPT

## 2018-06-13 PROCEDURE — 84484 ASSAY OF TROPONIN QUANT: CPT

## 2018-06-13 NOTE — RAD
AP PELVIS:

6/13/18

 

INDICATIONS:

Fall with injury to left hip.

 

Comparison made to recent pelvic films of  3/21/18.

 

FINDINGS/IMPRESSION:  

Bony pelvis appears intact. There are degenerative changes involving both SI joints and both hips. No
 acute fracture identified.

 

 

 

POS: [facility]

## 2018-06-13 NOTE — RAD
PORTABLE CHEST:

6/13/18

 

HISTORY: 

Fall. Head injury.

 

COMPARISON:  

2/13/18.

 

Lungs appear clear. No infiltrate or edema seen. The heart is mildly enlarged and there is mild vascu
lar engorgement. Findings appear stable from the prior exam. 

 

Significant degenerative changes at the left shoulder. Pacemaker leads are unchanged.

 

IMPRESSION:  

No acute process. 

 

POS: [facility]

## 2018-06-13 NOTE — RAD
LEFT HIP:

6/13/18

 

Two views. 

 

HISTORY: 

Fall with injury to left hip.

 

Mild degenerative change at the left hip. No acute fracture identified. 

 

IMPRESSION:  

No evidence of acute fracture. 

 

POS: [facility]

## 2018-06-13 NOTE — CT
CT HEAD WITHOUT CONTRAST:

6/13/18

 

Multiple axial tomograms obtained through the head without IV enhancement.

 

HISTORY: 

Fall with injury to head. 

 

Compared to recent head CT of 4/5/18.

 

There is cortical atrophy of the chronic ischemic white matter changes which are again noted. 

 

There is evidence of a tiny acute subdural hematoma along the right frontal convexity measuring in th
e 4 mm range. This is not producing any mass effect. 

 

There is another focal hemorrhagic contusion seen in the left posterior temporal lobe in a subcortica
l region measuring approximately 5 mm. No other hemorrhage identified. No evidence of skull fracture 
identified. 

 

IMPRESSION:  

1.      Tiny acute right subdural hematoma of the right frontal convexity. 

2.      A tiny focal hemorrhage contusion in the subcortical region of the left posterior temporal lo
be. 

 

Findings relayed to Dr. Black.

 

Code CR 

 

POS: [facility]

## 2018-06-14 LAB
ANION GAP SERPL CALC-SCNC: 10 MMOL/L (ref 10–20)
BASOPHILS # BLD AUTO: 0 THOU/UL (ref 0–0.2)
BASOPHILS NFR BLD AUTO: 0.9 % (ref 0–1)
BUN SERPL-MCNC: 24 MG/DL (ref 8.4–25.7)
CALCIUM SERPL-MCNC: 9.1 MG/DL (ref 7.8–10.44)
CHLORIDE SERPL-SCNC: 106 MMOL/L (ref 98–107)
CO2 SERPL-SCNC: 30 MMOL/L (ref 23–31)
CREAT CL PREDICTED SERPL C-G-VRATE: 54 ML/MIN (ref 70–130)
EOSINOPHIL # BLD AUTO: 0.1 THOU/UL (ref 0–0.7)
EOSINOPHIL NFR BLD AUTO: 1.1 % (ref 0–10)
GLUCOSE SERPL-MCNC: 83 MG/DL (ref 83–110)
HGB BLD-MCNC: 10.7 G/DL (ref 14–18)
LYMPHOCYTES # BLD: 1.9 THOU/UL (ref 1.2–3.4)
LYMPHOCYTES NFR BLD AUTO: 40.5 % (ref 21–51)
MCH RBC QN AUTO: 34.1 PG (ref 27–31)
MCV RBC AUTO: 104 FL (ref 80–94)
MONOCYTES # BLD AUTO: 0.7 THOU/UL (ref 0.11–0.59)
MONOCYTES NFR BLD AUTO: 14.1 % (ref 0–10)
NEUTROPHILS # BLD AUTO: 2.1 THOU/UL (ref 1.4–6.5)
NEUTROPHILS NFR BLD AUTO: 43.4 % (ref 42–75)
PLATELET # BLD AUTO: 247 THOU/UL (ref 130–400)
POTASSIUM SERPL-SCNC: 3.8 MMOL/L (ref 3.5–5.1)
RBC # BLD AUTO: 3.15 MILL/UL (ref 4.7–6.1)
SODIUM SERPL-SCNC: 142 MMOL/L (ref 136–145)
WBC # BLD AUTO: 4.7 THOU/UL (ref 4.8–10.8)

## 2018-06-14 RX ADMIN — Medication SCH: at 20:17

## 2018-06-14 NOTE — HP
Rich Rose PA-C, dictating for Maury Steven MD

 

This is a 30-minute initial patient evaluation in which greater than 50% of the exam was spent counse
ling and coordinating patient's care.  Remainder of the exam was spent in review of patient's medical
 records and appropriate imaging studies.

 

CHIEF COMPLAINT:  Fall with bilateral parietal subdural hematomas.

 

HISTORY OF PRESENT ILLNESS:  Mr. Reyes is a 77-year-old male who presents to [facility]
 for the above complaints.  Apparently, the patient states that falls are very common for him, althou
gh he does have a cane and walker that he uses at home when he remembers.  He notes falling roughly 1
 week ago and hitting his forehead.  Does have a history of CVA that has left him with some mild left
-sided weakness, left arm greater than left leg and significant dysarthria.  The patient complains of
 some hip pain on the left, but his imaging is negative for hip pathology.  He does note that he feel
s as though he has nerve pain including back pain and pain into the left leg, but is unable to provid
e any more details than that.  He currently denies neck pain or arm pain.  Does have a history of a p
acemaker and was treated with previously on 325 mg aspirin; but according to the patient's family, he
 has not been taking this over the past several months.  A head CT was obtained showing a very small 
right parietal and left parietal subdural hematoma and cervical spine imaging is negative for acute f
racture.

 

PHYSICAL EXAMINATION:  The patient is awake, alert, and appropriate.  His GCS is currently 15.  He do
es have significant dysarthria, although I am able to understand a few words that he says.  His famil
y is able to understand him and helps to interpret for him.  He also has what sounds like snoring res
pirations, although he has no difficulty protecting his airway and again when questioned about this, 
the patient's family states this is baseline for him as far as his respirations.  He has good strengt
h in all the extremities with the exception of some trace weakness in the left lower extremity and mi
ld weakness into the left upper extremity.  He has no worrisome myelopathic features on exam includin
g negative Jones's bilaterally and no increased tone.  He has no tenderness to palpation along the 
entirety of the spine.  He is minimally conversant during the exam, but again is very appropriate, un
derstands he is in the hospital and it is 2018.

 

IMPRESSION AND DIAGNOSIS:  History of fall with very small right parietal and left parietal subdural 
hematoma.

 

PLAN:  I have discussed the patient's case and imaging with Dr. Steven.  Also discussed the patient's
 imaging with his family noted that he does not require any type of neurosurgical intervention in reg
pastor to surgery.  We would like to admit him overnight for observation and we will repeat his head CT 
in the morning.  Should this be stable, hopefully we will be able to discharge him.  We will admit hi
m to the stroke unit with q.2 hours neuro checks.  He did have an elevated BNP and I will consult [facility] for this.  The patient's family certainly pleased that he does not require neurosurgical interv
ention.  His coags are normal.  Again, we will monitor the patient's neurologic status overnight and 
plan for repeat head CT noncontrast in the morning.  Please call with any changes in patient's neurol
ogic status.  We would like his systolic blood pressure to remain less than 150.

## 2018-06-14 NOTE — CT
PRELIMINARY REPORT/[facility]/EMERGENTY AFTER-HOURS PROCEDURE 

 

Addendum created by Willian Conteh MD on 6/14/2018 4:05 AM Central Time ([place]) THIS REPORT CONTA
INS FINDINGS THAT MAY BE CRITICAL TO PATIENT CARE. The findings were verbally communicated via teleph
one conference with RAIN Fernando at 4:05 AM CDT on 6/14/2018. The findings were acknowledged and unders
tood. Initial Report created on 6/14/2018 4:00 AM Central Time ([place] & [place])

 

CT Head Without Intravenous Contrast

 

CLINICAL HISTORY:

77 years old, male; Injury or trauma; Fall; Follow-up exam; Concussion / head injury; Patient HX: Pre
vious brain CT on pacs. M77 presents to the ed with C/O fall. Pt family reports pt fell one day pta a
nd denied hitting his head but hit his side. Pt HX is fall risk, and when he is weak he will fall. Pt
 family reports he lives her his daughter, no one saw the fall. Pt reports pain in his left hip, head
, and right knee. Pt family reports pt bumped his head one week pta due to a fall. Pt reports taking 
tylenol 3 when he woke up and another one later in the day before 3pm. Pt family says pt is acting no
rmal, mumbling behavior is normal for him.

 

TECHNIQUE:

Axial computed tomography images of the head/brain without intravenous contrast.

 

COMPARISON:

No relevant prior studies available.

 

FINDINGS:

Brain: 7 mm hyperattenuating focus within the left temporal lobe (series 2, image 13), possibly repre
senting intraparenchymal hemorrhagic contusion. Scattered areas of hypoattenuation, likely chronic sm
all vessel ischemic change, demyelination, or gliosis.

Ventricles: Normal.

Bones/joints: Normal. No acute fracture.

Soft tissues: Normal.

Sinuses: Minimal ethmoid and left maxillary sinus mucosal thickening. Changes of prior left maxillary
 sinus surgery.

Mastoid air cells: Normal as visualized. No mastoid effusion.

 

IMPRESSION:

1. 7 mm hyperattenuating focus within the left temporal lobe (series 2, image 13), possibly represent
ing intraparenchymal hemorrhagic contusion. Recommend followup.

2. Incidental/non-acute findings are described above.

 

Thank you for allowing us to participate in the care of your patient.

Dictated and Authenticated by: Willian Conteh MD

06/14/2018 4:00 AM Central Time ([place])

 

 

FINAL REPORT 

HEAD CT WITHOUT CONTRAST:

 

Date:  06/14/18 

Time:  0253 hours

 

COMPARISON:  

06/13/18. 

 

HISTORY:  

Evaluate bilateral subdural hematoma. 

 

FINDINGS:

I agree with the preliminary report given by [facility]. 

 

The visualized paranasal sinuses/mastoid air cells demonstrate no acute findings. There is evidence o
f prior paranasal sinus surgery on the left. 

 

There is no midline shift or mass effect seen. 

 

As seen on the prior examination, there is a nonspecific focal area of increased attenuation measurin
g 5.0 mm on axial image 13 in the inferior aspect of the left temporal lobe posteriorly. This may rep
resent a subcentimeter focus of hemorrhage. The prior exam demonstrated a possible tiny subdural hema
stacie versus artifact in the right frontal region which is no longer seen on this exam. Of note, prior
 imaging has been reviewed, including head CT performed 02/13/18. On that examination, the hyperdense
 focus in the posterior left temporal lobe is present and stable, thus likely on the basis of chronic
 calcification. 

 

IMPRESSION: 

I agree with the preliminary [facility] report that there is a stable hyperdense focus in the left temporal
 lobe. However, when reviewing multiple prior examinations, this finding is stable. No evidence for i
ntracranial hemorrhage. 

 

CODE T. 

 

 

POS: [facility]

## 2018-06-14 NOTE — PRG
DATE OF SERVICE:  06/14/2018

 

This is a 30 minute initial hospital visit note in which 30 minutes were spent in review of the imagi
ng record, evaluation of the patient, and 50% of the time was spent in counseling on Mr. Cory Reyes.

 

CHIEF COMPLAINT:  Left temporal lobe contusion.

 

HISTORY OF PRESENT ILLNESS:  I reviewed the notes of my colleague Rich Rose PA-C, and agree its
 content.  Ms. Reyes is a 77-year-old man who has a history of left-sided hemiplegia and garbled speec
h due to stroke, he presented after a fall with a very tiny hemorrhage in the deep left temporal lobe
.  This is inconsequential.  Neurologically, on exam, he is at baseline.  He has mumbling of his spee
ch, follows commands; however, quite well in the right upper extremity.  I will transfer him to our S
ound Service and we will arrange followup in 1 month with a repeat head CT.  I will sign off.

 

DIAGNOSIS:  Left temporal lobe hemorrhage status post fall.

## 2018-06-14 NOTE — CON
DATE OF CONSULTATION:  06/14/2018

 

REASON FOR CONSULTATION:  Medical management.

 

HISTORY OF PRESENT ILLNESS:  Mr. Cory Reyes is a 77-year-old man with history of CVA with residual le
ft-sided hemiplegia and garbled speech and hypertension who presented to the hospital after a fall an
d sustained a small hemorrhage in the deep left temporal lobe.  He was admitted to the Neurosurgery S
[facility] and after review the patient is not a surgical candidate as he is neurologically at his [facility]
ne and family were not keen on pursuing any surgical intervention.  Speech language pathology was con
sulted to see the patient as well as PT/OT.  The patient is currently at his baseline.  He has no com
plaints.  He denies chest pain, shortness of breath.  The only significant findings were the left-tseve
ed hemiplegia and garbled speech.  Hospitalist Service were consulted for further management of this 
patient.

 

PAST MEDICAL HISTORY:  As stated in the HPI.

 

PAST SURGICAL HISTORY:  Noncontributory.  

 

FAMILY HISTORY:  Reviewed and noncontributory.

 

ALLERGIES:  IODINE.

 

REVIEW OF SYSTEMS:  All systems reviewed and are negative except as stated in HPI.

 

PHYSICAL EXAMINATION:

VITAL SIGNS:  Blood pressure 139/66, oxygen saturation 96% on room air, respiratory rate 16, pulse ra
te 60.  Temperature 97.9 degrees Fahrenheit.

GENERAL:  Not in acute distress.  He is lying comfortably in bed.

HEENT:  Normocephalic, atraumatic.  Moist mucosa.

NECK:  Supple, full range of movement.

CARDIOVASCULAR:  S1 and S2 only with regular rate and rhythm.  No murmurs, rubs or gallops.

RESPIRATORY:  Vesicular breath sounds bilaterally.  No wheezes or rales.

ABDOMEN:  Soft, nontender, nondistended.  Bowel sounds normoactive.

NEUROLOGIC:  Alert and oriented x3.  Has residual left hemiparesis and garbled speech.

SKIN:  Warm, dry, well-perfused.  No rashes or lesions.

MUSCULOSKELETAL:  No edema.

 

LABORATORY DATA:  WBC 4.7, hemoglobin 10.7, platelet count 247.  Serum chemistry unremarkable.  INR 1
.1.  Urinalysis with no overt abnormalities.

 

IMAGING:  Brain CT without contrast done on 06/13/2018 showed a tiny acute right subdural hematoma of
 the right frontal convexity and a tiny focal hemorrhagic contusion in the subcortical region of the 
left posterior temporal lobe.  Repeat CT done 24 hours later showed a stable hypodense focus in the l
eft temporal lobe with no evidence of intracranial hemorrhage.

 

ASSESSMENT AND PLAN:

1.  Hypertension.

2.  Cerebrovascular accident with residual right hemiparesis.

3.  Subdural hematoma.

 

PLAN:  The patient is neurologically at his baseline, so no surgical intervention is warranted.  We w
ill continue his home medications, keep an eye on his blood pressure and follow up with SLP arm PT/OT
 recommendations and likely discharge home tomorrow.

## 2018-06-15 VITALS — SYSTOLIC BLOOD PRESSURE: 127 MMHG | DIASTOLIC BLOOD PRESSURE: 63 MMHG

## 2018-06-15 VITALS — TEMPERATURE: 98.8 F

## 2018-06-15 RX ADMIN — Medication SCH: at 11:01

## 2018-06-15 NOTE — PDOC.PN
- Subjective


Encounter Start Date: 06/15/18


Encounter Start Time: 10:06





Patient seen and examined. he had falls at home and was found to have stable 

bilateral parietal hematomas. No surgical intervention planned. PT/OT on board 

too and recommend SNF vs Rehab. 


He has no complaints today and no acute overnight events. 








- Objective


Resuscitation Status: 


 











Resuscitation Status           FULL:Full Resuscitation














MAR Reviewed: Yes


Vital Signs & Weight: 


 Vital Signs (12 hours)











  Temp Pulse Resp BP BP Pulse Ox


 


 06/15/18 08:00  98.7 F  64  16   132/68  96


 


 06/15/18 04:33   61   163/72 H  


 


 06/15/18 03:35  98.4 F  61  20   163/72 H  96


 


 06/14/18 23:51  98.3 F  60  16   140/72  95








 Weight











Weight                         155 lb 8 oz














I&O: 


 











 06/14/18 06/15/18 06/16/18





 06:59 06:59 06:59


 


Intake Total  1177 


 


Balance  1177 











Result Diagrams: 


 06/14/18 03:59





 06/14/18 03:59





Phys Exam





- Physical Examination


Constitutional: NAD


HEENT: moist MMs, sclera anicteric, oral pharynx no lesions


Neck: supple, full ROM


Respiratory: no wheezing, no rales, no rhonchi, clear to auscultation bilateral


Cardiovascular: RRR, no significant murmur, no rub


Gastrointestinal: soft, non-tender, no distention, positive bowel sounds


Musculoskeletal: no edema, pulses present


Residual hemiparesis





Dx/Plan


(1) HTN (hypertension)


Code(s): I10 - ESSENTIAL (PRIMARY) HYPERTENSION   Status: Chronic   


Qualifiers: 


   Hypertension type: essential hypertension 


Comment: Stable. Well controlled since home meds restarted. Continue home meds.

   





(2) Subdural hematoma


Code(s): S06.5X9A - TRAUM SUBDR HEM W LOC OF UNSP DURATION, INIT   Status: 

Acute   Comment: Stable and at baseline. Neurosurgery reviewed and no 

intervention planned. Repeat CT head showed no significant changes.    





(3) History of CVA (cerebrovascular accident)


Code(s): Z86.73 - PRSNL HX OF TIA (TIA), AND CEREB INFRC W/O RESID DEFICITS   

Status: Chronic   Comment: w residual hemiparesis and speech deficits. PT/OT 

and SLP on board.    





- Plan


cont current plan of care, plan discussed w/ family, PT/OT, , 

speech therapy





* .








Review of Systems





- Medications/Allergies


Allergies/Adverse Reactions: 


 Allergies











Allergy/AdvReac Type Severity Reaction Status Date / Time


 


iodine Allergy Intermediate  Verified 06/14/18 00:18











Medications: 


 Current Medications





Acetaminophen (Tylenol)  650 mg PO Q4H PRN


   PRN Reason: Headache/Fever or Pain


Amlodipine Besylate (Norvasc)  5 mg PO DAILY [facility]


Carvedilol (Coreg)  25 mg PO BID [facility]


   Last Admin: 06/14/18 20:17 Dose:  25 mg


Hydralazine HCl (Apresoline)  5 mg SLOW IVP Q15MIN PRN


   PRN Reason: Sbp Greater Than 150


   Last Admin: 06/15/18 04:33 Dose:  5 mg


Sodium Chloride (Normal Saline 0.9%)  1,000 mls @ 30 mls/hr IV .Q24H [facility]


   Last Admin: 06/14/18 20:17 Dose:  1,000 mls


Ramipril (Altace)  10 mg PO DAILY [facility]


Sodium Chloride (Flush - Normal Saline)  10 ml IVF Q12HR [facility]


   Last Admin: 06/14/18 20:17 Dose:  Not Given


Sodium Chloride (Flush - Normal Saline)  10 ml IVF PRN PRN


   PRN Reason: Saline Flush

## 2018-06-15 NOTE — DIS
DATE OF ADMISSION:  06/13/2018

 

DATE OF DISCHARGE:  06/15/2018

 

DISCHARGE DIAGNOSES:  Bilateral parietal hematoma, hypertension, history of CVA.

 

HISTORY OF PRESENT ILLNESS/HOSPITAL COURSE:  Mr. Eric Ray is a 77-year-old male with a history of C
VA with residual hemiparesis and garbled speech.  He has had a history of hypertension, who presents 
to the hospital after a fall.  Imagings showed a small hemorrhage in the deep left temporal lobe.  He
 was admitted to the Neurosurgical Service after repeat CT showed stable hematoma.  He was deemed not
 a surgical candidate as the patient was at his baseline.  Family also was not keen on proceeding wit
h any surgical intervention.  PT/OT as well as speech language pathology was consulted to evaluate th
e patient and the patient was deemed stable for discharge to inpatient rehabilitation facility only t
o the findings with left-sided hemiparesis and garbled speech.

 

DISCHARGE MEDICATIONS:  Amlodipine 5 mg daily, atorvastatin 40 mg daily, carvedilol 25 mg b.i.d., and
 ramipril 10 mg daily.

 

PHYSICAL EXAMINATION:

GENERAL:  He was examined on the day of discharge.

VITAL SIGNS:  Blood pressure 148/69, temperature 98 degrees Fahrenheit, pulse rate 64, respiratory ra
te 20, oxygen saturation 96% on room air.  For further details, please refer to today's progress note
 and physical examination only revealed his residual hemiparesis.  The patient seems to be at his [facility].

 

LABORATORY DATA:  WBC 4.7, hemoglobin 10.7, and platelet count 247.  Sodium 142, potassium 3.8, chlor
maricarmen 106, carbon dioxide 30, anion gap 10, BUN 24, and creatinine 1.1.

 

IMAGING DATA:  Initial brain CT on arrival showed tiny acute subdural hematoma of the right frontal c
onvexity with a tiny focal hemorrhage contusion in the subcortical region of the left posterior tempo
ral lobe.  Repeat showed no significant changes from the original.  He also had a chest x-ray, hip x-
ray, pelvic x-ray and cervical spine CT which showed degenerative changes of the cervical spine with 
no acute fracture.  He also had a large heterogeneous thyroid _____ does produce a mass effect on the
 trachea.

 

PROCEDURES:  None.

 

CONSULTS:  Hospitalist Service.  The patient was admitted to the Neurosurgical Service and transferre
d the patient to the hospitalist service.

 

CONDITION ON DISCHARGE:  Fair.

 

CARE GOALS:  To follow up with his primary care physician.  Followup for repeat imaging as per thyroi
d gland.

 

ACTIVITY:  As directed by PT/OT and speech language pathology.

 

DIET:  Low sodium, heart healthy.

 

Discharge time is 65 minutes.

## 2018-07-18 ENCOUNTER — HOSPITAL ENCOUNTER (OUTPATIENT)
Dept: HOSPITAL 92 - TBSIIMAG | Age: 78
Discharge: HOME | End: 2018-07-18
Attending: NEUROLOGICAL SURGERY
Payer: MEDICARE

## 2018-07-18 DIAGNOSIS — R93.0: ICD-10-CM

## 2018-07-18 DIAGNOSIS — I62.00: Primary | ICD-10-CM

## 2018-07-18 PROCEDURE — 70450 CT HEAD/BRAIN W/O DYE: CPT

## 2018-07-18 NOTE — CT
CT BRAIN:

 

Date:  07/18/18

 

HISTORY:  

Poor historian. Subdural hematoma. 

 

FINDINGS/IMPRESSION: 

 

Noncontrast enhanced CT images of brain obtained Comparison made to previous exam from 06/14/18. 

 

Noncontrast enhanced CT images of the brain demonstrate diffuse cortical atrophy and deep white matte
r ischemic changes. 

 

Area of hyperdensity seen in the left posterotemporal lobe region is unchanged since the previous exa
m. This may represent an area of calcification as it has not significantly changed in the interim of 
5 weeks. It is possible that this was an area of calcification rather than hemorrhage, as one would e
xpect a hemorrhage to resolve at this point. 

 

I conflicted that this may represent a possible cavernoma. In retrospect, it may have been present ba
ck on numerous previous comparison CT brain. 

 

 

 

 

POS: [facility]

## 2018-08-17 ENCOUNTER — HOSPITAL ENCOUNTER (EMERGENCY)
Dept: HOSPITAL 92 - ERS | Age: 78
LOS: 1 days | Discharge: HOME | End: 2018-08-18
Payer: MEDICARE

## 2018-08-17 DIAGNOSIS — S70.02XA: Primary | ICD-10-CM
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
## 2018-08-17 DIAGNOSIS — I10: ICD-10-CM

## 2018-08-17 LAB
ALBUMIN SERPL BCG-MCNC: 3.5 G/DL (ref 3.4–4.8)
ALP SERPL-CCNC: 65 U/L (ref 40–150)
ALT SERPL W P-5'-P-CCNC: 13 U/L (ref 8–55)
ANION GAP SERPL CALC-SCNC: 10 MMOL/L (ref 10–20)
AST SERPL-CCNC: 15 U/L (ref 5–34)
BILIRUB SERPL-MCNC: 0.3 MG/DL (ref 0.2–1.2)
BUN SERPL-MCNC: 26 MG/DL (ref 8.4–25.7)
CALCIUM SERPL-MCNC: 9.1 MG/DL (ref 7.8–10.44)
CHLORIDE SERPL-SCNC: 108 MMOL/L (ref 98–107)
CO2 SERPL-SCNC: 27 MMOL/L (ref 23–31)
CREAT CL PREDICTED SERPL C-G-VRATE: 0 ML/MIN (ref 70–130)
CRYSTAL-AUWI FLAG: 0 (ref 0–15)
GLOBULIN SER CALC-MCNC: 3.3 G/DL (ref 2.4–3.5)
GLUCOSE SERPL-MCNC: 81 MG/DL (ref 83–110)
HEV IGM SER QL: 59 (ref 0–7.99)
HGB BLD-MCNC: 11.7 G/DL (ref 14–18)
HYALINE CASTS #/AREA URNS LPF: (no result) LPF
MCH RBC QN AUTO: 35.4 PG (ref 27–31)
MCV RBC AUTO: 104 FL (ref 78–98)
MDIFF COMPLETE?: YES
PATHC CAST-AUWI FLAG: 6.83 (ref 0–2.49)
PLATELET # BLD AUTO: 204 THOU/UL (ref 130–400)
POTASSIUM SERPL-SCNC: 4.3 MMOL/L (ref 3.5–5.1)
RBC # BLD AUTO: 3.31 MILL/UL (ref 4.7–6.1)
RBC UR QL AUTO: (no result) HPF (ref 0–3)
SODIUM SERPL-SCNC: 141 MMOL/L (ref 136–145)
SP GR UR STRIP: 1.02 (ref 1–1.04)
SPERM-AUWI FLAG: 0 (ref 0–9.9)
WBC # BLD AUTO: 4.6 THOU/UL (ref 4.8–10.8)
WBC UR QL AUTO: (no result) HPF (ref 0–3)
YEAST-AUWI FLAG: 0 (ref 0–25)

## 2018-08-17 PROCEDURE — 71045 X-RAY EXAM CHEST 1 VIEW: CPT

## 2018-08-17 PROCEDURE — 72170 X-RAY EXAM OF PELVIS: CPT

## 2018-08-17 PROCEDURE — 70450 CT HEAD/BRAIN W/O DYE: CPT

## 2018-08-17 PROCEDURE — 72125 CT NECK SPINE W/O DYE: CPT

## 2018-08-17 PROCEDURE — 85025 COMPLETE CBC W/AUTO DIFF WBC: CPT

## 2018-08-17 PROCEDURE — 81003 URINALYSIS AUTO W/O SCOPE: CPT

## 2018-08-17 PROCEDURE — 51701 INSERT BLADDER CATHETER: CPT

## 2018-08-17 PROCEDURE — 81015 MICROSCOPIC EXAM OF URINE: CPT

## 2018-08-17 PROCEDURE — 80053 COMPREHEN METABOLIC PANEL: CPT

## 2018-08-17 PROCEDURE — 36415 COLL VENOUS BLD VENIPUNCTURE: CPT

## 2018-08-17 NOTE — CT
CT BRAIN 

8/17/18

 

HISTORY: 

Fall. 

 

Noncontrast enhanced CT images of the brain is obtained on 8/17/18.

 

Comparison made to previous exam from 7/18/18.

 

Noncontrast enhanced CT images of the brain demonstrate cortical atrophy and deep white matter ischem
ic changes. No evidence of acute intracranial  masses, hemorrhages, strokes or contusions seen.

 

IMPRESSION:  

No evidence of acute intracranial pathology seen. 

 

POS: [facility]

## 2018-08-17 NOTE — RAD
AP VIEW PELVIS:

8/17/18

 

HISTORY: 

Fall three days ago with pelvic pain. 

 

AP view pelvis is obtained on 8/17/18.

 

Comparison made to previous exam from 6/13/18.

 

AP view pelvis demonstrates the pelvis to be unremarkable. No evidence of pelvic fractures, subluxati
ons, or bony lesions seen. 

 

IMPRESSION:  

Unremarkable AP view pelvis. 

 

POS: [facility]

## 2018-08-17 NOTE — RAD
HISTORY: 

Fall three days ago with chest pain.

 

AP view chest is obtained on 8/17/18.

 

Comparison made to previous exam from 6/13/18.

 

AP view chest demonstrates the lungs to be well aerated. No evidence of active intrathoracic disease 
seen. No evidence of effusions, pneumonia, or pneumothorax seen.

 

Bilateral shoulder degenerative changes compatible with osteoarthritis seen. 

 

IMPRESSION:  

Unremarkable AP view chest. 

 

POS: [facility]

## 2018-12-30 ENCOUNTER — HOSPITAL ENCOUNTER (INPATIENT)
Dept: HOSPITAL 92 - ERS | Age: 78
LOS: 4 days | Discharge: HOSPICE | DRG: 871 | End: 2019-01-03
Attending: INTERNAL MEDICINE | Admitting: INTERNAL MEDICINE
Payer: MEDICARE

## 2018-12-30 VITALS — BODY MASS INDEX: 21 KG/M2

## 2018-12-30 DIAGNOSIS — B95.4: ICD-10-CM

## 2018-12-30 DIAGNOSIS — Z91.041: ICD-10-CM

## 2018-12-30 DIAGNOSIS — J96.01: ICD-10-CM

## 2018-12-30 DIAGNOSIS — Z66: ICD-10-CM

## 2018-12-30 DIAGNOSIS — E78.5: ICD-10-CM

## 2018-12-30 DIAGNOSIS — I25.10: ICD-10-CM

## 2018-12-30 DIAGNOSIS — F03.90: ICD-10-CM

## 2018-12-30 DIAGNOSIS — Z95.0: ICD-10-CM

## 2018-12-30 DIAGNOSIS — M19.90: ICD-10-CM

## 2018-12-30 DIAGNOSIS — N17.9: ICD-10-CM

## 2018-12-30 DIAGNOSIS — I50.33: ICD-10-CM

## 2018-12-30 DIAGNOSIS — E87.0: ICD-10-CM

## 2018-12-30 DIAGNOSIS — M54.16: ICD-10-CM

## 2018-12-30 DIAGNOSIS — E44.0: ICD-10-CM

## 2018-12-30 DIAGNOSIS — I69.391: ICD-10-CM

## 2018-12-30 DIAGNOSIS — I69.354: ICD-10-CM

## 2018-12-30 DIAGNOSIS — J69.0: ICD-10-CM

## 2018-12-30 DIAGNOSIS — G93.49: ICD-10-CM

## 2018-12-30 DIAGNOSIS — I69.398: ICD-10-CM

## 2018-12-30 DIAGNOSIS — Z51.5: ICD-10-CM

## 2018-12-30 DIAGNOSIS — J86.9: ICD-10-CM

## 2018-12-30 DIAGNOSIS — R13.12: ICD-10-CM

## 2018-12-30 DIAGNOSIS — I11.0: ICD-10-CM

## 2018-12-30 DIAGNOSIS — A41.9: Primary | ICD-10-CM

## 2018-12-30 DIAGNOSIS — R65.20: ICD-10-CM

## 2018-12-30 LAB
ALBUMIN SERPL BCG-MCNC: 2.4 G/DL (ref 3.4–4.8)
ALP SERPL-CCNC: 86 U/L (ref 40–150)
ALT SERPL W P-5'-P-CCNC: 9 U/L (ref 8–55)
AMYLASE PLR-CCNC: 30 U/L
ANALYZER IN CARDIO: (no result)
ANION GAP SERPL CALC-SCNC: 19 MMOL/L (ref 10–20)
ANION GAP SERPL CALC-SCNC: 23 MMOL/L (ref 10–20)
AST SERPL-CCNC: 12 U/L (ref 5–34)
BASE EXCESS STD BLDA CALC-SCNC: -1.6 MEQ/L
BILIRUB SERPL-MCNC: 1 MG/DL (ref 0.2–1.2)
BUN SERPL-MCNC: 76 MG/DL (ref 8.4–25.7)
BUN SERPL-MCNC: 83 MG/DL (ref 8.4–25.7)
CA-I BLDA-SCNC: 1.14 MMOL/L (ref 1.12–1.3)
CALCIUM SERPL-MCNC: 8.8 MG/DL (ref 7.8–10.44)
CALCIUM SERPL-MCNC: 9.4 MG/DL (ref 7.8–10.44)
CASTS #/AREA URNS LPF: (no result) LPF
CHLORIDE SERPL-SCNC: 119 MMOL/L (ref 98–107)
CHLORIDE SERPL-SCNC: 124 MMOL/L (ref 98–107)
CK MB SERPL-MCNC: 1.3 NG/ML (ref 0–6.6)
CK SERPL-CCNC: 158 U/L (ref 30–200)
CO2 SERPL-SCNC: 16 MMOL/L (ref 23–31)
CO2 SERPL-SCNC: 23 MMOL/L (ref 23–31)
CREAT CL PREDICTED SERPL C-G-VRATE: 0 ML/MIN (ref 70–130)
CREAT CL PREDICTED SERPL C-G-VRATE: 14 ML/MIN (ref 70–130)
CRYSTAL-AUWI FLAG: 0.5 (ref 0–15)
CRYSTALS URNS QL MICRO: (no result) HPF
GLOBULIN SER CALC-MCNC: 4.9 G/DL (ref 2.4–3.5)
GLUCOSE PLR-MCNC: (no result) MG/DL
GLUCOSE SERPL-MCNC: 120 MG/DL (ref 83–110)
GLUCOSE SERPL-MCNC: 152 MG/DL (ref 83–110)
HCO3 BLDA-SCNC: 23.1 MEQ/L (ref 22–28)
HCT VFR BLDA CALC: 36 % (ref 42–52)
HEV IGM SER QL: 18.7 (ref 0–7.99)
HGB BLD-MCNC: 9.8 G/DL (ref 14–18)
HGB BLDA-MCNC: 12.2 G/DL (ref 14–18)
HYALINE CASTS #/AREA URNS LPF: (no result) LPF
LDH PLR L TO P-CCNC: 4468 U/L
LIPASE SERPL-CCNC: 12 U/L (ref 8–78)
MACROCYTES BLD QL SMEAR: (no result) (100X)
MCH RBC QN AUTO: 31.3 PG (ref 27–31)
MCV RBC AUTO: 103 FL (ref 78–98)
MDIFF COMPLETE?: YES
NEUTROPHILS NFR FLD: 97 %
NONHEMATIC CELLS NFR FLD MANUAL: 1 %
O2 A-A PPRESDIFF RESPIRATORY: 38.66 MM[HG] (ref 0–20)
PATHC CAST-AUWI FLAG: 21.37 (ref 0–2.49)
PCO2 BLDA: 39.1 MMHG (ref 35–45)
PH BLDA: 7.39 [PH] (ref 7.35–7.45)
PLATELET # BLD AUTO: 421 THOU/UL (ref 130–400)
PLATELET BLD QL SMEAR: (no result)
PO2 BLDA: 62.2 MMHG (ref 60–?)
POLYCHROMASIA BLD QL SMEAR: (no result) (100X)
POTASSIUM BLD-SCNC: 3.64 MMOL/L (ref 3.7–5.3)
POTASSIUM SERPL-SCNC: 4.4 MMOL/L (ref 3.5–5.1)
POTASSIUM SERPL-SCNC: 5.8 MMOL/L (ref 3.5–5.1)
PROT PLR-MCNC: 4.8 G/DL
RBC # BLD AUTO: 3.12 MILL/UL (ref 4.7–6.1)
RBC # FLD AUTO: (no result) /CUMM
RBC BACKGROUND COUNT: 0
RBC UR QL AUTO: (no result) HPF (ref 0–3)
SODIUM SERPL-SCNC: 157 MMOL/L (ref 136–145)
SODIUM SERPL-SCNC: 157 MMOL/L (ref 136–145)
SP GR UR STRIP: 1.02 (ref 1–1.04)
SPECIMEN DRAWN FROM PATIENT: (no result)
SPERM-AUWI FLAG: 0 (ref 0–9.9)
TROPONIN I SERPL DL<=0.01 NG/ML-MCNC: 0.05 NG/ML (ref ?–0.03)
WBC # BLD AUTO: 8.5 THOU/UL (ref 4.8–10.8)
WBC # FLD AUTO: (no result) /CUMM
WBC BACKGROUND COUNT: 0
WBC UR QL AUTO: (no result) HPF (ref 0–3)
YEAST FLD HPF-#/AREA: (no result) HPF
YEAST-AUWI FLAG: 0 (ref 0–25)

## 2018-12-30 PROCEDURE — 87206 SMEAR FLUORESCENT/ACID STAI: CPT

## 2018-12-30 PROCEDURE — 80202 ASSAY OF VANCOMYCIN: CPT

## 2018-12-30 PROCEDURE — 80048 BASIC METABOLIC PNL TOTAL CA: CPT

## 2018-12-30 PROCEDURE — 82805 BLOOD GASES W/O2 SATURATION: CPT

## 2018-12-30 PROCEDURE — 83615 LACTATE (LD) (LDH) ENZYME: CPT

## 2018-12-30 PROCEDURE — 82550 ASSAY OF CK (CPK): CPT

## 2018-12-30 PROCEDURE — 88305 TISSUE EXAM BY PATHOLOGIST: CPT

## 2018-12-30 PROCEDURE — 71045 X-RAY EXAM CHEST 1 VIEW: CPT

## 2018-12-30 PROCEDURE — 80053 COMPREHEN METABOLIC PANEL: CPT

## 2018-12-30 PROCEDURE — 96361 HYDRATE IV INFUSION ADD-ON: CPT

## 2018-12-30 PROCEDURE — 85060 BLOOD SMEAR INTERPRETATION: CPT

## 2018-12-30 PROCEDURE — 81003 URINALYSIS AUTO W/O SCOPE: CPT

## 2018-12-30 PROCEDURE — 85025 COMPLETE CBC W/AUTO DIFF WBC: CPT

## 2018-12-30 PROCEDURE — 36415 COLL VENOUS BLD VENIPUNCTURE: CPT

## 2018-12-30 PROCEDURE — 93005 ELECTROCARDIOGRAM TRACING: CPT

## 2018-12-30 PROCEDURE — 51702 INSERT TEMP BLADDER CATH: CPT

## 2018-12-30 PROCEDURE — 83735 ASSAY OF MAGNESIUM: CPT

## 2018-12-30 PROCEDURE — 89051 BODY FLUID CELL COUNT: CPT

## 2018-12-30 PROCEDURE — 84484 ASSAY OF TROPONIN QUANT: CPT

## 2018-12-30 PROCEDURE — 87205 SMEAR GRAM STAIN: CPT

## 2018-12-30 PROCEDURE — 96375 TX/PRO/DX INJ NEW DRUG ADDON: CPT

## 2018-12-30 PROCEDURE — 87116 MYCOBACTERIA CULTURE: CPT

## 2018-12-30 PROCEDURE — 81015 MICROSCOPIC EXAM OF URINE: CPT

## 2018-12-30 PROCEDURE — 84157 ASSAY OF PROTEIN OTHER: CPT

## 2018-12-30 PROCEDURE — 96365 THER/PROPH/DIAG IV INF INIT: CPT

## 2018-12-30 PROCEDURE — 87070 CULTURE OTHR SPECIMN AEROBIC: CPT

## 2018-12-30 PROCEDURE — 94640 AIRWAY INHALATION TREATMENT: CPT

## 2018-12-30 PROCEDURE — 83880 ASSAY OF NATRIURETIC PEPTIDE: CPT

## 2018-12-30 PROCEDURE — 84100 ASSAY OF PHOSPHORUS: CPT

## 2018-12-30 PROCEDURE — 83690 ASSAY OF LIPASE: CPT

## 2018-12-30 PROCEDURE — 71250 CT THORAX DX C-: CPT

## 2018-12-30 PROCEDURE — 83986 ASSAY PH BODY FLUID NOS: CPT

## 2018-12-30 PROCEDURE — 82553 CREATINE MB FRACTION: CPT

## 2018-12-30 PROCEDURE — S0028 INJECTION, FAMOTIDINE, 20 MG: HCPCS

## 2018-12-30 PROCEDURE — 83605 ASSAY OF LACTIC ACID: CPT

## 2018-12-30 PROCEDURE — 87040 BLOOD CULTURE FOR BACTERIA: CPT

## 2018-12-30 PROCEDURE — 82945 GLUCOSE OTHER FLUID: CPT

## 2018-12-30 PROCEDURE — 36416 COLLJ CAPILLARY BLOOD SPEC: CPT

## 2018-12-30 PROCEDURE — 82150 ASSAY OF AMYLASE: CPT

## 2018-12-30 PROCEDURE — 88112 CYTOPATH CELL ENHANCE TECH: CPT

## 2018-12-30 RX ADMIN — FAMOTIDINE SCH MG: 10 INJECTION, SOLUTION INTRAVENOUS at 20:59

## 2018-12-30 RX ADMIN — PIPERACILLIN SODIUM, TAZOBACTAM SODIUM SCH MLS: 2; .25 INJECTION, POWDER, LYOPHILIZED, FOR SOLUTION INTRAVENOUS at 23:08

## 2018-12-30 NOTE — CON
DATE OF CONSULTATION:  



TYPE OF CONSULTATION:  Nephrology.



REASON FOR CONSULTATION:  Elevated creatinine.



HISTORY OF PRESENT ILLNESS:  This is a very pleasant 78-year-old gentleman, who

presented to the hospital with a baseline creatinine 0.95, which increased to 3.7

today.  He has had other episodes of acute kidney injury in the last few years.  The

patient can give no history, has altered mental status and is resting. 



PAST MEDICAL HISTORY:  Significant for dementia, CVA, and nursing home resident.



FAMILY HISTORY:  Negative for ESRD.



SOCIAL HISTORY:  No tobacco, alcohol, or drug use.



REVIEW OF SYSTEMS:  Unobtainable.



PHYSICAL EXAMINATION:

GENERAL:  The patient is resting well. 

VITAL SIGNS:  Afebrile pulse 90, breathing 16, and blood pressure 90/53. 

GENERAL APPEARANCE AND MENTAL STATUS:  Fair. 

HEAD/NECK:  Normocephalic.  Atraumatic. 

EYES:  EOMI.  No deformity. 

EARS:  Clear.  No ulcers. 

NOSE:  Intact.  No lesions. 

MOUTH:  Clear.  No discharge. 

THROAT:  Clear.  No exudate. 

LUNGS:  Clear.  No crackles. 

CARDIAC:  S1, S2.  No rub. 

ABDOMEN:  Benign.  Bowel sounds positive. 

GENITALIA/RECTUM:  Rm absent. 

BACK/EXTREMITIES:  Edema 0+. 

NEUROLOGICAL:  The patient is incoherent and resting. 

SKIN:   

LYMPHATICS:



LABORATORY DATA:  Labs show sodium 157, potassium 4.4, creatinine 2.7.



ASSESSMENT AND PLAN:  

1. Chronic kidney disease due to decreased effective arterial blood volume.

Continue gentle hydration. 

2. Hypertension, stable.

3. Anemia, stable.

4. Hypotension, probably sepsis. 



Overall prognosis is poor.  We will check the sodium every 4 to 6 hours and decrease

IV fluids as needed. 







Job ID:  [id number]

## 2018-12-30 NOTE — RAD
PORTABLE AP CHEST XRAY:

 

DATE: 12/30/2018.

 

HISTORY: 

Dyspnea.  Recently diagnosed with pneumonia.

 

COMPARISON: 

8/17/2018.

 

FINDINGS: 

Dual-lead right subclavian cardiac pacemaking device remains in place.  There has been interval devel
opment of a large right pleural effusion and associated atelectasis.  The left lung is clear.  Cardia
c silhouette is magnified by projection.  The pulmonary vasculature is at the upper limits of normal.
  Vascular calcifications are seen in the thoracic aorta.

 

IMPRESSION: 

Large right pleural effusion and atelectasis.

 

POS: [facility]

## 2018-12-30 NOTE — CT
NONCONTRAST CT THORAX:

 

Date: 18

 

History: Diagnosed with pneumonia. Diminished oxygen saturation. Labored breathing. 

 

Comparison: CT cervical spine, 18. 

 

FINDINGS: 

Right subclavian cardiac pacemaking device is noted in place. 

 

There is a large right pleural effusion which may be partially loculated with associated consolidatio
n likely related to passive atelectasis. 

 

As noted on prior CT cervical spine, there is a large right sided neck mass which extends into the me
diastinum likely related to large thyroid mass/goiter. This was also present on a prior study on 12-3
0-17. There is heterogeneity involving the thyroid gland with prominence of a left lobe of the thyroi
d gland with associated calcifications present. 

 

There is a tiny pericardial effusion. 

 

Vascular calcifications are seen in the coronary arteries as well as involving the thoracic aorta. 

 

There is volume loss seen in the left lower lobe. Left lung is otherwise clear. No left pleural effus
ion is present. 

 

There are multiple hypodense lesions seen involving the kidneys bilaterally, some of which are incomp
letely imaged. The largest on the right measures 3.1 cm. Findings are statistically  most likely rela
chralene to renal cysts. However, there is a exophytic increased density lesion seen at the posterior aspe
ct superior pole right kidney measuring 2.2 cm which cannot be characterized as a simple cyst. This c
ould represent a hypodense cyst. However, this was prior on CT abdomen on 3-14-18 although demonstrat
ed diminished attenuation on that study. The largest cyst in this region has  in size. 

 

Degenerative changes are seen in the spine. There is bilateral glenohumeral osteoarthropathy and acro
mioclavicular joint osteoarthritis. 

 

IMPRESSION: 

1. Interval development of a large right pleural effusion, a portion of which is partially loculated.
 Area of consolidation on the right is probably attributable to passive atelectasis. 

2. Enlarged heterogeneous thyroid gland much larger involving the right lobe of the thyroid gland whi
ch may be related to thyroid goiter. This is unchanged from prior study including studies dating back
 to 2017. 

3. Left lung is clear. 

4. Multiple hypodense bilateral renal lesions incompletely imaged which were also seen on prior CT ex
am on 3-14-18 and demonstrated findings most compatible with cysts. However, there is an exophytic hy
perdense lesion in the superior pole right kidney which demonstrated diminished attenuation on the pr
ior exam and may represent a small amount of hemorrhage or increased proteinaceous debris present wit
hin this cyst related to a Bosniak type II cystic renal lesion. 

 

POS: [facility]

## 2018-12-30 NOTE — HP
REASON FOR ADMISSION:  Acute respiratory failure with hypoxia, pneumonia with 
likely

parapneumonic effusion on the right, acute kidney injury, severe dehydration. 



HISTORY OF PRESENTING ILLNESS:  Please note, the patient is a resident of 
[facility] and is currently obtunded.  Majority of this history is obtained by

talking to two daughters at bedside and the ER physician, Dr. Burrows.  The

patient apparently had upper respiratory infection, signs and symptoms 
yesterday at

the nursing home.  He was started on antibiotic.  This morning, his shortness of

breath got worse and he was started on nebulizers along with antibiotics and 
finally

they had to transfer him to the emergency room as he was not doing well. 



PAST MEDICAL AND SURGICAL HISTORY:  History of prior subdural hematoma; history 
of

left temporal lobe hemorrhage status post fall in the month of June; 
hypertension;

history of CVA with prior left hemiparesis; diastolic dysfunction; pacemaker;

dyslipidemia; osteoarthritis; recurrent falls; dementia; lumbar radiculopathy;

coronary artery disease; right inguinal hernia repair; cystoscopies; history of

urethral stricture. 



CURRENT MEDICATIONS:  Per [facility] [place] records, the patient is on,

1. Ramipril 10 mg daily.

2. Prednisone which was started on 29th at 50 mg daily.

3. Multivitamin one tablet once daily.

4. Misoprostol 200 mcg three times daily.

5. Norvasc 5 mg daily.

6. Aspirin 325 mg daily.

7. Coreg 25 mg p.o. twice daily.

8. Omnicef 300 mg twice daily, which was started on the 29th.

9. Colace 100 mg daily.

10. Diclofenac 50 mg three times daily p.r.n.

11. Pepcid 20 mg twice daily.

12. Folic acid 1 mg daily.

13. Lasix 20 mg twice daily.

14. Methotrexate 20 mg every Friday.



ALLERGIES:  ALLERGIC TO IODINE.



PERSONAL HISTORY:  Does not abuse alcohol or drugs.  No history of smoking.



FAMILY HISTORY:  There is history of heart disease in the family.



REVIEW OF SYSTEMS:  Cannot be obtained as the patient is not oriented.



CODE STATUS:  DNR.  I have just discussed this with the patient's two daughters
, who

were also his power of , Ms. Olivia Urias and MsEdvin Felix Demian 
[place]

number to reach them; [phone] and [phone].  The patient is a do not

resuscitate per the POA. 



PHYSICAL EXAMINATION:

GENERAL:  The patient is an 78-year-old male, who is currently obtunded and is 
in

respiratory distress. 

VITAL SIGNS:  Blood pressure 106/64, pulse 64 per minute, respiratory rate 26 
per

minute, temperature 98.6 degrees Fahrenheit, and saturating 97% on 35% Venti 
mask. 

NECK:  Supple.  No elevated JVD. 

EYES:  Extraocular muscles are intact.  Pupils are reacting to light.  Oral 
cavity,

mucous membranes are dry.  No exudates or congestion. 

CARDIOVASCULAR SYSTEM:  S1 and S2 heard.  Regular rhythm. 

RESPIRATORY system:  Air entry is decreased on the right hemithorax.  Rhonchi 
plus

bilateral. 

ABDOMEN:  Soft.  Bowel sounds heard.  No tenderness, rigidity, or guarding. 

EXTREMITIES:  No peripheral edema or calf tenderness. 

VASCULAR SYSTEM:  Peripheral pulses 1+ bilateral.  No ischemic ulcerations or

gangrene. 

CENTRAL NERVOUS SYSTEM:  No gross focal signs seen.  The patient has prior 
history

of left hemiparesis.  Full neurologic exam is difficult to perform due to 
patient's

obtunded status at present. 

PSYCHIATRIC SYSTEM:  No obvious hallucinations or delusions at present.



LABORATORY DATA:  White count of 8, hemoglobin and hematocrit 10 and 32, 
platelet

count 421 with 46% neutrophils and 37% bands.  Blood gas done shows a pH of 7.39
,

pCO2 of 39, PO2 of 62.  Sodium 157, chloride 119, serum bicarb 23, BUN 76,

creatinine 3.7, glucose 120.  BNP is 266.  Troponin I 0.04.  Albumin is 2.4.  
Lipase

is 12.  UA shows small leuk esterase, 7 to 10 wbc's, 1+ yeast.  EKG done shows 
paced

rhythm at 66 beats per minute. 



CLINICAL IMPRESSION AND PLAN:  The patient will be admitted to ICU for acute

respiratory failure with hypoxia, pneumonia with likely right parapneumonic

effusion, acute kidney injury, severe dehydration.  He will be hydrated with D5

water at 125 mLs/hr,  blood and urine cultures have been obtained in the ER.

He will be on cefepime, Levaquin, and if needed, vancomycin will be added.  The

patient is not conscious at present and we will try to place him on BiPAP if he

tolerates.  I have spoken to Dr. Cheung for pulmonology and likely will need 
right

thoracentesis.  I have spoken with Dr. Smith for Nephrology consultation as well.

The two children and power of  for Mr. Reyes are aware of the patient's 
poor

prognosis.  They want him to be a do not resuscitate, but want everything done,

stopping short of it.  We will keep him n.p.o. for now until he wakes up and be 
on

DuoNeb q.6 hourly and we will also add a small dose of steroids to perk him up a

little bit. 







Job ID:  [id number]



MTDD

## 2018-12-31 LAB
ANION GAP SERPL CALC-SCNC: 17 MMOL/L (ref 10–20)
BUN SERPL-MCNC: 83 MG/DL (ref 8.4–25.7)
CALCIUM SERPL-MCNC: 8.7 MG/DL (ref 7.8–10.44)
CHLORIDE SERPL-SCNC: 118 MMOL/L (ref 98–107)
CO2 SERPL-SCNC: 22 MMOL/L (ref 23–31)
CREAT CL PREDICTED SERPL C-G-VRATE: 15 ML/MIN (ref 70–130)
GLUCOSE SERPL-MCNC: 224 MG/DL (ref 83–110)
HGB BLD-MCNC: 11.1 G/DL (ref 14–18)
MCH RBC QN AUTO: 30.4 PG (ref 27–31)
MCV RBC AUTO: 101 FL (ref 78–98)
MDIFF COMPLETE?: YES
PLATELET # BLD AUTO: 385 THOU/UL (ref 130–400)
PLATELET BLD QL SMEAR: (no result)
POTASSIUM SERPL-SCNC: 4.1 MMOL/L (ref 3.5–5.1)
RBC # BLD AUTO: 3.66 MILL/UL (ref 4.7–6.1)
SODIUM SERPL-SCNC: 152 MMOL/L (ref 136–145)
SODIUM SERPL-SCNC: 153 MMOL/L (ref 136–145)
WBC # BLD AUTO: 6.8 THOU/UL (ref 4.8–10.8)

## 2018-12-31 PROCEDURE — 0W9900Z DRAINAGE OF RIGHT PLEURAL CAVITY WITH DRAINAGE DEVICE, OPEN APPROACH: ICD-10-PCS | Performed by: THORACIC SURGERY (CARDIOTHORACIC VASCULAR SURGERY)

## 2018-12-31 RX ADMIN — PIPERACILLIN SODIUM, TAZOBACTAM SODIUM SCH MLS: 2; .25 INJECTION, POWDER, LYOPHILIZED, FOR SOLUTION INTRAVENOUS at 05:08

## 2018-12-31 RX ADMIN — FAMOTIDINE SCH MG: 10 INJECTION, SOLUTION INTRAVENOUS at 21:45

## 2018-12-31 RX ADMIN — Medication SCH ML: at 08:52

## 2018-12-31 RX ADMIN — Medication SCH ML: at 21:49

## 2018-12-31 RX ADMIN — PIPERACILLIN SODIUM, TAZOBACTAM SODIUM SCH MLS: 2; .25 INJECTION, POWDER, LYOPHILIZED, FOR SOLUTION INTRAVENOUS at 11:03

## 2018-12-31 RX ADMIN — PIPERACILLIN SODIUM, TAZOBACTAM SODIUM SCH MLS: 2; .25 INJECTION, POWDER, LYOPHILIZED, FOR SOLUTION INTRAVENOUS at 17:31

## 2018-12-31 NOTE — PRG
DATE OF SERVICE:  12/31/2018



SUBJECTIVE:  Mr. Reyes remains in the CCU.  He has aphasia with difficulty expressing

himself.  He appears to be stable in my opinion. 



OBJECTIVE:  VITAL SIGNS:  Temperature is 98.8, pulse 54, blood pressure 94/44, O2

saturation 100%.  A 24-hour intake 1935, output 420. 

HEENT:  Unremarkable. 

NECK:  No JVD. 

LUNGS:  He has almost absent breath sounds on the right, clear on the left. 

CARDIAC:  S1 and S2, regular. 

ABDOMEN:  Soft and nontender. 

EXTREMITIES:  No edema.



LABORATORY DATA:  White blood cell count 6.8, hematocrit 37.1, and platelet count

385.  Sodium 153, potassium 4.1, chloride 118, CO2 of 23, BUN 83, creatinine 3.5,

glucose 224.  Pleural fluid demonstrated a glucose less than 20, LDH 4468,

significantly high white blood cell count. 



ASSESSMENT:  Right-sided empyema.



PLAN:  I have spoken with the family.  The patient has a positive Gram stain on the

fluid and has numbers that are indicative of empyema.  He will need drainage or

decortication.  Based on the CT scan, I think we could probably treat this with

chest tube provided the fluid is not too thick.  I consulted Dr. Zachary Ponce.  We

will follow. 







Job ID:  [id number]

## 2018-12-31 NOTE — CON
DATE OF CONSULTATION:  12/31/2018



REASON FOR CONSULTATION:  The patient for a right empyema.



HISTORY OF PRESENT ILLNESS:  Mr. Reyes is a nursing home resident, who is status post

cerebrovascular accident in June of 2018.  He presented with labored breathing.

Chest x-ray showed a large right chest fluid collection.  He underwent thoracentesis

with purulent fluid evacuation.  Only enough fluid was taken for culture.  I have

been asked to see him for further treatment. 



PAST MEDICAL HISTORY:  

1. Cerebrovascular accident.

2. Hypertension.

3. Dyslipidemia.

4. Chronic diastolic heart failure.

5. Left-sided hemiplegia.

6. Osteoarthritis.

7. Dementia.

8. Coronary artery disease.



PAST SURGICAL HISTORY:  

1. Urethral stricture.

2. Right hernia repair.



SOCIAL HISTORY:  He is a resident of nursing facility.  He does not communicate.



FAMILY HISTORY:  Noncontributory.



ALLERGIES:  IODINE.



MEDICATIONS:  Have been reviewed.



PHYSICAL EXAMINATION:

GENERAL:  This is an elderly gentleman with labored breathing in the intensive care

unit. 

VITAL SIGNS:  His temperature is 98.8, pulse is 69, and blood pressure is 111/72. 

LUNGS:  Have diminished breath sounds throughout.  His respiratory rate is

approximately 20 and he has grunting respirations. 

HEART:  Rhythm is regular. 

ABDOMEN:  Soft and nontender. 

EXTREMITIES:  No edema.



LABORATORY DATA:  Chest x-ray and CT have been reviewed.  Laboratory of note,

hemoglobin is 11.1 and white blood cell count is 6.8.  Creatinine is 3.48. 



ASSESSMENT AND PLAN:  Right-sided empyema.  He has been on broad-spectrum

antibiotics.  Gram stain shows gram-positive cocci.  I discussed chest tube

placement with the family and they are agreeable and signed consent. 







Job ID:  [id number]

## 2018-12-31 NOTE — CON
DATE OF CONSULTATION:  12/30/2018



SERVICE:  Pulmonary Medicine.



REASON FOR CONSULT:  Pleural effusion, ICU patient.



HISTORY OF PRESENT ILLNESS:  The patient is an 78-year-old  male

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

stroke in June 2018.  Apparently, he is quite debilitated at baseline.  He was 
found

obtunded this morning at his nursing facility.  He had increasing work of 
breathing.

 He was initiated on antibiotic, but decompensated and was transferred to the

emergency department.  Chest x-ray showed a large pleural effusion.  CT scan was

performed.  The results are discussed below.  He cannot provide any additional

elements of the history because of his encephalopathy. 



PAST MEDICAL HISTORY:  

1. CVA.

2. Hypertension.

3. Dyslipidemia.

4. Chronic diastolic heart failure.

5. Dense left-sided hemiplegia.

6. Osteoarthritis.

7. Dementia.

8. Lumbar radiculopathy.

9. History of coronary artery disease.



PAST SURGICAL HISTORY:  

1. Urethral stricture.

2. Right inguinal hernia repair.



SOCIAL HISTORY:  He is currently a resident of a nursing facility.  He has no 
access

to alcohol, tobacco, or illicit drug use. 



FAMILY HISTORY:  Noncontributory.



ALLERGIES:  IODINE.



MEDICATIONS:  List of his inpatient medications was reviewed.  Multiple small

updates were made. 



REVIEW OF SYSTEMS:  Could not be obtained as the patient is currently obtunded.



PHYSICAL EXAMINATION:

VITAL SIGNS:  Afebrile, pulse 64, blood pressure 85/40, respirations 21, 
saturation

100% on 2 L nasal cannula. 

HEENT:  Normocephalic and atraumatic.  Sclerae white.  Conjunctivae pink.  Oral

mucosa is moist without lesions. 

LUNGS:  Decreased air entry on the right.  Good air entry on the left.  There 
is no

prolonged expiratory phase or wheezing appreciated. 

HEART:  Normal rate regular. 

ABDOMEN:  Soft, nontender, and nondistended.  Bowel sounds are positive. 

MUSCULOSKELETAL:  No cyanosis or clubbing.  There is no pitting in the bilateral

lower extremities. 

NEUROLOGIC:  Grossly nonfocal.



LABORATORY DATA:  WBC 8.5, hemoglobin 9.8, platelets 421,000.  Band count is 37%
.

PH 7.39, pCO2 of 39, PO2 of 62, corresponding to saturation of 89%.  At this 
time,

he was on room air.  Creatinine 3.84, which is significantly worse than 
baseline.

Sodium 157, potassium 5.8, chloride 124, bicarb 26, BUN 83.  Lactate is 3.9 and

up-trending.  Troponin is 0.047.  Liver function studies are unremarkable.  BNP 
266.

 Urinalysis is unremarkable. 



IMAGING:  There is a large effusion on the right on the chest x-ray.  Left lung

appears to be relatively spared. 



CT of the chest demonstrates loculated effusion in the right lung.  The left 
lung is

essentially unremarkable.  There is a dense infiltrate in the right lower lobe.

This is mostly in the posterior segments. 



ASSESSMENT:  

1. Acute hypoxic respiratory failure.

2. Severe sepsis.

3. Acute kidney injury.

4. Healthcare-associated pneumonia.

5. Empyema, suspected.

6. Dementia, advanced.



DISCUSSION AND PLAN:  We will continue antibiotics, nebulized medications, and

steroids.  I will do a thoracentesis at bedside today.  If it appears that this

fluid is infected, he will likely need chest tubes and/or decortication.  That 
being

said, given his advanced debility, I do not think he is a good candidate for

decortication.  Cardiothoracic Surgery consultation will be placed as my 
suspicion

is he is going to require definitive drainage.  I agree with the Palliative Care

consultation, which is currently pending.  We will continue the free water for 
the

time being, but if his sodium corrects  to the normal range, we will 
need

to transition over to half NS. 



70 minutes have been devoted to this patient in various activities.  I 
personally reviewed all imaging studies and laboratory data noted within this 
document.  For fifty percent of this time, I was interacting with the patient 
at the bedside or coordinating care with the care team.  For the remainder of 
the time I was immediately available to the patient in the hospital unit.  



Job ID:  [id number]



[facility]

## 2018-12-31 NOTE — PDOC.PN
- Subjective


Encounter Start Date: 12/31/18


Encounter Start Time: 11:40


Subjective: awakens easily, non verbal


-: follows verbal stimuli sometimes


-: not in distress





- Objective


Resuscitation Status - Order Detail:





12/30/18 21:29


Resuscitation Status Routine 


   Resuscitation Status: PRTL: Chem-Intubation


   Discussed with: family wants chem and intubation








MAR Reviewed: Yes


Vital Signs & Weight: 


 Vital Signs (12 hours)











  Temp Pulse Resp Pulse Ox


 


 12/31/18 11:55  98.7 F   


 


 12/31/18 11:29     100


 


 12/31/18 08:00   66  26 H 


 


 12/31/18 07:20     100


 


 12/31/18 07:00  98.8 F   


 


 12/31/18 03:00  98.6 F   








 Weight











Weight                         142 lb 6.698 oz











 Most Recent Monitor Data











Heart Rate from ECG            63


 


NIBP                           95/51


 


NIBP BP-Mean                   65


 


Respiration from ECG           25


 


SpO2                           100














I&O: 


 











 12/30/18 12/31/18 01/01/19





 06:59 06:59 06:59


 


Intake Total  1935 640


 


Output Total  420 1860


 


Balance  1515 -1220











Result Diagrams: 


 12/31/18 05:30





 12/31/18 05:30





Phys Exam





- Physical Examination


HEENT: PERRLA, sclera anicteric


Neck: no JVD, supple


Respiratory: no wheezing


decreased air entry right infra axillary area


Cardiovascular: RRR, no significant murmur


Gastrointestinal: soft, non-tender, positive bowel sounds


Musculoskeletal: no edema, pulses present


?left hemiparesis, moves right side better, aphasia





Dx/Plan


(1) Empyema lung


Code(s): J86.9 - PYOTHORAX WITHOUT FISTULA   Status: Acute   Comment: has chest 

tube   





(2) PNA (pneumonia)


Code(s): J18.9 - PNEUMONIA, UNSPECIFIED ORGANISM   Status: Acute   


Qualifiers: 


   Pneumonia type: aspiration pneumonia   Laterality: right   Lung location: 

lower lobe of lung 





(3) Acute encephalopathy


Code(s): G93.40 - ENCEPHALOPATHY, UNSPECIFIED   Status: Acute   





(4) Acute respiratory failure with hypoxia


Code(s): J96.01 - ACUTE RESPIRATORY FAILURE WITH HYPOXIA   Status: Resolved   





(5) CARLY (acute kidney injury)


Code(s): N17.9 - ACUTE KIDNEY FAILURE, UNSPECIFIED   Status: Acute   





(6) Severe dehydration


Code(s): E86.0 - DEHYDRATION   Status: Acute   





(7) Moderate protein-calorie malnutrition


Code(s): E44.0 - MODERATE PROTEIN-CALORIE MALNUTRITION   Status: Chronic   





(8) FTT (failure to thrive) in adult


Status: Chronic   





(9) HTN (hypertension)


Code(s): I10 - ESSENTIAL (PRIMARY) HYPERTENSION   Status: Chronic   


Qualifiers: 


   Hypertension type: essential hypertension 





(10) History of CVA (cerebrovascular accident)


Code(s): Z86.73 - PRSNL HX OF TIA (TIA), AND CEREB INFRC W/O RESID DEFICITS   

Status: Chronic   Comment: w residual hemiparesis and speech deficits.    





(11) Sepsis


Code(s): A41.9 - SEPSIS, UNSPECIFIED ORGANISM   Status: Acute   


Qualifiers: 


   Sepsis type: sepsis due to unspecified organism   Qualified Code(s): A41.9 - 

Sepsis, unspecified organism   





- Plan


is on vanc and zosyn


-: nebs, D5W


-: may dc steroids if ok with pulm


-: poor prognosis, was DNAR yesterday then family changed him to no compressio


-: -n but intubation is OK per staff. Speech eval for oral intake/nutrition





* .








Review of Systems





- Medications/Allergies


Allergies/Adverse Reactions: 


 Allergies











Allergy/AdvReac Type Severity Reaction Status Date / Time


 


iodine Allergy Intermediate  Verified 06/14/18 00:18











Medications: 


 Current Medications





Acetaminophen (Tylenol)  650 mg VT Q4H PRN


   PRN Reason: Headache/Fever/Mild Pain (1-3)


Albuterol/Ipratropium (Duoneb)  3 ml NEB [id number] RAFA


   Last Admin: 12/31/18 08:00 Dose:  3 ml


Famotidine (Pepcid)  20 mg SLOW IVP QPM RAFA


   Last Admin: 12/30/18 20:59 Dose:  20 mg


Heparin Sodium (Porcine) (Heparin)  5,000 units SC BID RAFA


Vancomycin HCl 500 mg/ Sodium (Chloride)  100 mls @ 100 mls/hr IVPB Q24HR@1500 

RAFA


Dextrose/Water (D5w)  1,000 mls @ 100 mls/hr IV .Q10H RAFA


   Last Admin: 12/31/18 03:52 Dose:  1,000 mls


Piperacillin Sod/Tazobactam (Sod 2.25 gm/ Sodium Chloride)  100 mls @ 200 mls/

hr IVPB Q6HR RAFA


   Last Admin: 12/31/18 11:03 Dose:  100 mls


Methylprednisolone Sodium Succinate (Solu-Medrol)  20 mg IVP Q8HR [facility]


   Last Admin: 12/31/18 05:08 Dose:  20 mg


Miscellaneous Medication (Pharmacy To Dose)  0 each IVPB PRN PRN


   PRN Reason: VANC PHARMACY TO DOSE


Sodium Chloride (Flush - Normal Saline)  10 ml IVF Q12HR [facility]


   Last Admin: 12/31/18 08:52 Dose:  10 ml


Sodium Chloride (Flush - Normal Saline)  10 ml IVF PRN PRN


   PRN Reason: Saline Flush

## 2018-12-31 NOTE — PRG
DATE OF SERVICE:  12/31/2018



SUBJECTIVE:  An 78-year-old gentleman, being seen for hypernatremia.



OBJECTIVE:  CONSTITUTIONAL:  The patient is resting well. 

VITAL SIGNS:  Afebrile.  Pulse 62, breathing 16, blood pressure 94/64. 

GENERAL APPEARANCE AND MENTAL STATUS:  Fair. 

HEAD/NECK:  Normocephalic.  Atraumatic. 

EYES:  EOMI.  No deformity. 

EARS:  Clear.  No ulcers. 

NOSE:  Intact.  No lesions. 

MOUTH:  Clear.  No discharge. 

THROAT:  Clear.  No exudate. 

LUNGS:  Clear.  No crackles. 

CARDIAC:  S1, S2.  No rub. 

ABDOMEN:  Benign.  Bowel sounds positive. 

GENITALIA/RECTUM:  Rm absent. 

BACK/EXTREMITIES:  Edema 0+. 

NEUROLOGICAL:  Alert and motor intact.



LABORATORY DATA:  Labs show hemoglobin 11, sodium 153.



ASSESSMENT AND RECOMMENDATION:  

1. Hypernatremia, improved.

2. Hyperkalemia, improved.

3. Acute kidney injury, improving.  No indication for dialysis.







Job ID:  [id number]

## 2018-12-31 NOTE — RAD
CHEST 1 VIEW:

 

HISTORY: 

Status post chest tube insertion

 

COMPARISON: 

12/30/2018.

 

FINDINGS: 

There is interval placement of a large-bore right-sided chest tube.  Interval resolution of previousl
y noted pleural fluid.  Small loculated pneumothorax does remain.  Opacification of the left lung par
enchyma may represent atelectasis or pneumonia.  Stable configuration of the cardiac silhouette.  Sta
ble atherosclerosis.  Stable aeration of the left lung.  Right-sided transvenous pacemaker is once ag
ain demonstrated.

 

IMPRESSION: 

Interval placement of a large-bore right-sided chest tube with resolution of previously noted right-s
ided pleural fluid.

 

POS: [facility]

## 2018-12-31 NOTE — OP
DATE OF PROCEDURE:  12/31/2018



PREOPERATIVE DIAGNOSIS:  Right-sided empyema.



POSTOPERATIVE DIAGNOSIS:  Right-sided empyema.



PROCEDURE PERFORMED:  Right tube thoracostomy.



ANESTHESIA:  1% lidocaine for local.



DESCRIPTION OF PROCEDURE:  After consent was obtained from the family, right chest

wall was prepped and draped in the usual sterile fashion.  Skin and subcutaneous

tissues were anesthetized with 1% lidocaine.  Skin incision was made.  The

pericostal area was then anesthetized with the remainder of the 1% lidocaine.  The

chest was sharply entered with scissors.  The incision was spread and a 32-[place]

chest tube placed posteriorly.  There was an immediate return of 1700 mL of purulent

fluid.  The patient's respirations became much less labored at that point.  Tube was

secured with silk suture. 



Followup chest x-ray shows good tube positioning with almost complete evacuation of

the chest. 







Job ID:  [id number]

## 2019-01-01 LAB
ANION GAP SERPL CALC-SCNC: 14 MMOL/L (ref 10–20)
BUN SERPL-MCNC: 85 MG/DL (ref 8.4–25.7)
CALCIUM SERPL-MCNC: 8.6 MG/DL (ref 7.8–10.44)
CHLORIDE SERPL-SCNC: 117 MMOL/L (ref 98–107)
CO2 SERPL-SCNC: 23 MMOL/L (ref 23–31)
CREAT CL PREDICTED SERPL C-G-VRATE: 19 ML/MIN (ref 70–130)
GLUCOSE SERPL-MCNC: 212 MG/DL (ref 83–110)
HGB BLD-MCNC: 8.9 G/DL (ref 14–18)
MACROCYTES BLD QL SMEAR: (no result) (100X)
MCH RBC QN AUTO: 31.8 PG (ref 27–31)
MCV RBC AUTO: 101 FL (ref 78–98)
MDIFF COMPLETE?: YES
PLATELET # BLD AUTO: 366 THOU/UL (ref 130–400)
PLATELET BLD QL SMEAR: (no result)
POTASSIUM SERPL-SCNC: 3.8 MMOL/L (ref 3.5–5.1)
RBC # BLD AUTO: 2.78 MILL/UL (ref 4.7–6.1)
SODIUM SERPL-SCNC: 150 MMOL/L (ref 136–145)
VANCOMYCIN TROUGH SERPL-MCNC: 9 UG/ML
WBC # BLD AUTO: 7 THOU/UL (ref 4.8–10.8)

## 2019-01-01 RX ADMIN — HEPARIN SODIUM SCH UNITS: 5000 INJECTION, SOLUTION INTRAVENOUS; SUBCUTANEOUS at 20:01

## 2019-01-01 RX ADMIN — PIPERACILLIN SODIUM, TAZOBACTAM SODIUM SCH MLS: 2; .25 INJECTION, POWDER, LYOPHILIZED, FOR SOLUTION INTRAVENOUS at 05:06

## 2019-01-01 RX ADMIN — FAMOTIDINE SCH MG: 10 INJECTION, SOLUTION INTRAVENOUS at 20:01

## 2019-01-01 RX ADMIN — PIPERACILLIN SODIUM, TAZOBACTAM SODIUM SCH MLS: 2; .25 INJECTION, POWDER, LYOPHILIZED, FOR SOLUTION INTRAVENOUS at 12:42

## 2019-01-01 RX ADMIN — PIPERACILLIN SODIUM, TAZOBACTAM SODIUM SCH MLS: 2; .25 INJECTION, POWDER, LYOPHILIZED, FOR SOLUTION INTRAVENOUS at 00:02

## 2019-01-01 RX ADMIN — PIPERACILLIN SODIUM, TAZOBACTAM SODIUM SCH MLS: 2; .25 INJECTION, POWDER, LYOPHILIZED, FOR SOLUTION INTRAVENOUS at 17:55

## 2019-01-01 RX ADMIN — Medication SCH ML: at 08:23

## 2019-01-01 RX ADMIN — HEPARIN SODIUM SCH UNITS: 5000 INJECTION, SOLUTION INTRAVENOUS; SUBCUTANEOUS at 08:23

## 2019-01-01 RX ADMIN — Medication SCH ML: at 20:01

## 2019-01-01 NOTE — PRG
DATE OF SERVICE:  01/01/2019



SUBJECTIVE:  Cory Reyes remains encephalopathic in the ICU.



OBJECTIVE:  VITAL SIGNS:  Pulse 65, blood pressure 110/73, saturations 100%,

respirations 18.  Status post chest tube insertion for a parapneumonic effusion. 

CHEST:  Decreased breath sounds.  No wheezing. 

CARDIAC:  Normal S1, S2.  No gallops. 

ABDOMEN:  No masses.



LABORATORY DATA:  Creatinine is 2.88, sodium 150.  White count 7000, H and H of 8

and 28, platelet count 366.  X-ray shows much improvement today. 



IMPRESSION:  

1. Right-sided empyema status post chest tube insertion.

2. Dysphagia.

3. Encephalopathy.



PLAN:  He is on Zosyn and vancomycin.  I would continue supportive care.

Apparently, he is a chemical intubation code only.  No CPR. 



PROGNOSIS:  Remains guarded. 



We will follow while in the ICU.







Job ID:  [id number]

## 2019-01-01 NOTE — PDOC.PN
- Subjective


Encounter Start Date: 01/01/19


Encounter Start Time: 11:19





Mr. Reyes was seen today in follow-up of Aspiration pneumonia, with empyema. He 

is confused. He can not communicate ant needs or concerns.He is breathing 

comfortably on room air, oxygenating at 100%.





- Objective


Resuscitation Status - Order Detail:





12/30/18 21:29


Resuscitation Status Routine 


   Resuscitation Status: PRTL: Chem-Intubation


   Discussed with: family wants chem and intubation








MAR Reviewed: Yes


Vital Signs & Weight: 


 Vital Signs (12 hours)











  Temp Pulse Resp Pulse Ox


 


 01/01/19 08:00  98.6 F   


 


 01/01/19 07:03   65  21 H  100


 


 01/01/19 04:00  97.7 F   


 


 01/01/19 00:52     100


 


 01/01/19 00:00  98.6 F   








 Weight











Weight                         142 lb 6.698 oz











 Most Recent Monitor Data











Heart Rate from ECG            65


 


NIBP                           120/76


 


NIBP BP-Mean                   90


 


Respiration from ECG           24


 


SpO2                           100














I&O: 


 











 12/31/18 01/01/19 01/02/19





 06:59 06:59 06:59


 


Intake Total 1935 3194 


 


Output Total 420 3300 400


 


Balance 1515 -106 -400











Result Diagrams: 


 01/01/19 05:46





 01/01/19 05:45





Phys Exam





- Physical Examination


HEENT: PERRLA


+ rhonchi bilaterally, decreased breath sounds at both bases


Cardiovascular: RRR, no significant murmur, no rub


Gastrointestinal: soft, non-tender, no distention, positive bowel sounds


Musculoskeletal: no edema, pulses present


Deviation from normal: Disoriented X3





Dx/Plan


(1) Empyema lung


Code(s): J86.9 - PYOTHORAX WITHOUT FISTULA   Status: Acute   Comment: has chest 

tube   





(2) PNA (pneumonia)


Code(s): J18.9 - PNEUMONIA, UNSPECIFIED ORGANISM   Status: Acute   


Qualifiers: 


   Pneumonia type: aspiration pneumonia   Laterality: right   Lung location: 

lower lobe of lung 





(3) Acute respiratory failure with hypoxia


Code(s): J96.01 - ACUTE RESPIRATORY FAILURE WITH HYPOXIA   Status: Resolved   





(4) Moderate protein-calorie malnutrition


Code(s): E44.0 - MODERATE PROTEIN-CALORIE MALNUTRITION   Status: Chronic   





(5) HTN (hypertension)


Code(s): I10 - ESSENTIAL (PRIMARY) HYPERTENSION   Status: Chronic   


Qualifiers: 


   Hypertension type: essential hypertension 





(6) Dementia


Code(s): F03.90 - UNSPECIFIED DEMENTIA WITHOUT BEHAVIORAL DISTURBANCE   Status: 

Acute   





- Plan





* Aspiration Pneumonia with Right sided Empyema- continue Zosyn and Vancomycin


* He is at high aspiration risk- I am told that the family does not want PEG 

tube placement- skye hold off on feeding patient for now- will need to discuss 

with family


* Hypernatremia- continue Hypotonic fluids.


* HTN- Blood pressure is stable


* Malnutrition- likely due advanced dementia and dysphagia


* Dementia- likely multi-infarc

## 2019-01-01 NOTE — CON
DATE OF CONSULTATION:  01/01/2019



GASTROENTEROLOGY CONSULTATION:  



CHIEF COMPLAINT:  Trouble swallowing.



HISTORY OF PRESENT ILLNESS:  Mr. Reyes is an 78-year-old man who was admitted on

12/30/2018, with aspiration pneumonia.  He underwent chest tube placement for

empyema and had a large amount of pus removed with that.  The chest tube remains in

place.  Due to ongoing aspiration risk, GI was consulted for gastrostomy tube

placement.  The patient is not communicative. 



PAST MEDICAL HISTORY:  Stroke, subdural hematoma, hypertension, dementia,

osteoarthritis, dyslipidemia, coronary artery disease, and rheumatoid arthritis. 



PAST SURGICAL HISTORY:  Inguinal hernia repair, cystoscopy for urethral stricture,

and chest tube placement. 



FAMILY HISTORY:  Negative for GI malignancy.



SOCIAL HISTORY:  No alcohol, tobacco, or drugs.



ALLERGIES:  IODINE.



MEDICATIONS:  Prior to admission, included;

1. Prednisone.

2. Methotrexate.

3. Diclofenac.

4. Pepcid.

5. Ramipril.

6. Misoprostol.

7. Aspirin.

8. Coreg.

9. Colace.

10. Lasix. 

Here in the hospital, he is currently on;

1. Famotidine.

2. Subcu heparin.

3. Zosyn.

4. Vancomycin.



REVIEW OF SYSTEMS:  Unobtainable due to the patient's aphagia and dementia history.



PHYSICAL EXAMINATION:

VITAL SIGNS:  Temperature is 98.5, pulse 65, and blood pressure 124/68. 

GENERAL:  He is in no acute distress.  He is breathing spontaneously on room air. 

LUNGS:  Clear to auscultation bilaterally. 

HEART:  Regular rate and rhythm without murmur.  He has a chest tube in place. 

ABDOMEN:  Soft, nontender, and nondistended.  Bowel sounds are present. 

EXTREMITIES:  Show no lower extremity edema.



LABORATORY DATA:  White blood cell count 7.0, hemoglobin 8.9, and platelets 366.

Creatinine 2.88. 



IMPRESSION:  

1. Oropharyngeal dysphagia.

2. Aspiration pneumonia with empyema, status post chest tube, currently on Zosyn and

vancomycin. 



RECOMMENDATIONS:  

1. We will plan EGD with percutaneous endoscopic gastrostomy tube placement tomorrow.

2. The risks and benefits of the procedure were discussed in detail with the

patient's family, Andres. 







Job ID:  [id number]

## 2019-01-01 NOTE — RAD
PORTABLE AP CHEST XRAY:

 

DATE: 1/1/2019.

 

HISTORY: 

On ventilator.  Followup evaluation.

 

COMPARISON: 

12/31/2018.

 

FINDINGS: 

Dual-lead right subclavian cardiac pacemaking device remains in place.  Right-sided thoracostomy tube
 is again noted in place with tip overlying the medial right mid lung zone.  There is a small right-s
ided pneumothorax which has improved at the right lung base, but pneumothorax is now also seen at the
 right lung apex.  Subcutaneous emphysema is seen at the lateral right chest.  The left lung is clear
.  Cardiac silhouette is within normal limits.  There is volume loss in the right mid lung zone and a
t the right lung base.

 

IMPRESSION: 

1.  Right-sided thoracostomy tube remaining in place with persistent small right-sided pneumothorax w
hich does appear mildly improved at the right lung base.

 

2.  Atelectasis in the right mid lung zone and at the right lung base.

 

POS: FILEMON

## 2019-01-02 LAB
ANION GAP SERPL CALC-SCNC: 13 MMOL/L (ref 10–20)
BUN SERPL-MCNC: 59 MG/DL (ref 8.4–25.7)
CALCIUM SERPL-MCNC: 8.8 MG/DL (ref 7.8–10.44)
CHLORIDE SERPL-SCNC: 119 MMOL/L (ref 98–107)
CO2 SERPL-SCNC: 22 MMOL/L (ref 23–31)
CREAT CL PREDICTED SERPL C-G-VRATE: 29 ML/MIN (ref 70–130)
GLUCOSE SERPL-MCNC: 135 MG/DL (ref 83–110)
HGB BLD-MCNC: 9.7 G/DL (ref 14–18)
MCH RBC QN AUTO: 31.5 PG (ref 27–31)
MCV RBC AUTO: 101 FL (ref 78–98)
MDIFF COMPLETE?: YES
PLATELET # BLD AUTO: 292 THOU/UL (ref 130–400)
PLATELET BLD QL SMEAR: (no result)
POLYCHROMASIA BLD QL SMEAR: (no result) (100X)
POTASSIUM SERPL-SCNC: 3.9 MMOL/L (ref 3.5–5.1)
RBC # BLD AUTO: 3.1 MILL/UL (ref 4.7–6.1)
SODIUM SERPL-SCNC: 150 MMOL/L (ref 136–145)
WBC # BLD AUTO: 9.1 THOU/UL (ref 4.8–10.8)

## 2019-01-02 RX ADMIN — LIDOCAINE HYDROCHLORIDE SCH ML: 20 SOLUTION ORAL at 20:16

## 2019-01-02 RX ADMIN — PIPERACILLIN SODIUM, TAZOBACTAM SODIUM SCH MLS: 2; .25 INJECTION, POWDER, LYOPHILIZED, FOR SOLUTION INTRAVENOUS at 00:01

## 2019-01-02 RX ADMIN — Medication SCH ML: at 20:16

## 2019-01-02 RX ADMIN — Medication SCH ML: at 09:52

## 2019-01-02 RX ADMIN — LIDOCAINE HYDROCHLORIDE SCH ML: 20 SOLUTION ORAL at 16:25

## 2019-01-02 RX ADMIN — PIPERACILLIN SODIUM, TAZOBACTAM SODIUM SCH MLS: 2; .25 INJECTION, POWDER, LYOPHILIZED, FOR SOLUTION INTRAVENOUS at 12:17

## 2019-01-02 RX ADMIN — HEPARIN SODIUM SCH: 5000 INJECTION, SOLUTION INTRAVENOUS; SUBCUTANEOUS at 09:52

## 2019-01-02 RX ADMIN — HEPARIN SODIUM SCH UNITS: 5000 INJECTION, SOLUTION INTRAVENOUS; SUBCUTANEOUS at 20:09

## 2019-01-02 RX ADMIN — PIPERACILLIN SODIUM, TAZOBACTAM SODIUM SCH MLS: 2; .25 INJECTION, POWDER, LYOPHILIZED, FOR SOLUTION INTRAVENOUS at 05:43

## 2019-01-02 RX ADMIN — PIPERACILLIN SODIUM, TAZOBACTAM SODIUM SCH MLS: 2; .25 INJECTION, POWDER, LYOPHILIZED, FOR SOLUTION INTRAVENOUS at 18:27

## 2019-01-02 RX ADMIN — PIPERACILLIN SODIUM, TAZOBACTAM SODIUM SCH MLS: 2; .25 INJECTION, POWDER, LYOPHILIZED, FOR SOLUTION INTRAVENOUS at 23:40

## 2019-01-02 RX ADMIN — FAMOTIDINE SCH MG: 10 INJECTION, SOLUTION INTRAVENOUS at 20:08

## 2019-01-02 NOTE — PDOC.PN
- Subjective


Encounter Start Date: 01/02/19


Encounter Start Time: 14:51





Mr. Reyes was seen today in follow-up of aspiration pneumonia, and large 

empyema. He has met with Palliative care, and the daughters have decided to 

change his code status to DNAR. They are also contemplating comfort care only.





- Objective


Resuscitation Status - Order Detail:





01/02/19 14:50


Resuscitation Status Routine 


   Resuscitation Status: DNAR: NO Resuscitation


   Discussed with: d/w POA: Ms.Sahil and Juan


   Additional comments: Phone# to reach above: [phone], [phone]








MAR Reviewed: Yes


Vital Signs & Weight: 


 Vital Signs (12 hours)











  Temp Pulse Resp Pulse Ox


 


 01/02/19 14:16   60  21 H  100


 


 01/02/19 12:00  98.1 F   


 


 01/02/19 08:00  98.2 F    100


 


 01/02/19 07:14   60  18  99


 


 01/02/19 04:00  97.8 F   








 Weight











Weight                         142 lb 6.698 oz











 Most Recent Monitor Data











Heart Rate from ECG            57


 


NIBP                           120/68


 


NIBP BP-Mean                   85


 


Respiration from ECG           22


 


SpO2                           100














I&O: 


 











 01/01/19 01/02/19 01/03/19





 06:59 06:59 06:59


 


Intake Total 3194 2895 


 


Output Total 3300 2470 990


 


Balance -106 425 -990











Result Diagrams: 


 01/02/19 08:06





 01/02/19 08:06





Phys Exam





- Physical Examination


HEENT: PERRLA


Respiratory: no wheezing


rales at the bases, and decreased breath sounds at the left base


Cardiovascular: RRR, no significant murmur, no rub


Gastrointestinal: soft, positive bowel sounds


Musculoskeletal: no edema, pulses present





Dx/Plan


(1) Empyema lung


Code(s): J86.9 - PYOTHORAX WITHOUT FISTULA   Status: Acute   Comment: has chest 

tube   





(2) PNA (pneumonia)


Code(s): J18.9 - PNEUMONIA, UNSPECIFIED ORGANISM   Status: Acute   


Qualifiers: 


   Pneumonia type: aspiration pneumonia   Laterality: right   Lung location: 

lower lobe of lung 





(3) Acute respiratory failure with hypoxia


Code(s): J96.01 - ACUTE RESPIRATORY FAILURE WITH HYPOXIA   Status: Resolved   





(4) Moderate protein-calorie malnutrition


Code(s): E44.0 - MODERATE PROTEIN-CALORIE MALNUTRITION   Status: Chronic   





(5) HTN (hypertension)


Code(s): I10 - ESSENTIAL (PRIMARY) HYPERTENSION   Status: Chronic   


Qualifiers: 


   Hypertension type: essential hypertension 





(6) Dementia


Code(s): F03.90 - UNSPECIFIED DEMENTIA WITHOUT BEHAVIORAL DISTURBANCE   Status: 

Acute   





- Plan





* Pneumonia with Empyema- will continue antibiotics for today, await further 

guidance from family 


* His code status has been changed to DNAR, and this was verified with the 

patient's daughter


* Will add Morphine for pain, as well as viscous Lidocaine for mouth pain


* HTN- blood pressure is stable


* He can moved to the medical floor.

## 2019-01-02 NOTE — PRG
DATE OF SERVICE:  01/02/2019



SERVICE:  Pulmonary Medicine.



INTERVAL HISTORY:  The patient is doing fine from respiratory standpoint.  He cannot

provide any additional elements of the history because of his chronic

encephalopathy.  Otherwise, there has been no interval change to his condition. 



PHYSICAL EXAMINATION:

VITAL SIGNS:  Afebrile, pulse 60, blood pressure 120/68, respirations 21, saturation

100% on room air. 

GENERAL:  The patient is awake and alert, in no apparent distress. 

LUNGS:  Decent air entry.  No rhonchi or wheezing appreciated. 

HEART:  Normal rate, regular. 

ABDOMEN:  Soft, nontender, and nondistended.  Bowel sounds are positive. 

MUSCULOSKELETAL:  No cyanosis or clubbing.  There is no pitting in the bilateral

lower extremities. 

NEUROLOGIC:  Grossly nonfocal.



LABORATORY DATA:  WBC 9.1, hemoglobin 9.7, platelets 292,000.  Neutrophil count is

82%.  Bands have improved to 1%.  Sodium 150 and downtrending.  Potassium 3.9,

chloride 119.  Basic metabolic profile is otherwise unremarkable and his creatinine

is improved to 1.83.  LDH is elevated, amylase, and glucose is low.  PH was

extremely low.  Neutrophil count is 97%.  Pleural fluid is growing strep anginosus.

Blood cultures x2 remain unremarkable. 



IMAGING:  Chest x-ray demonstrates no interval change.  There is a small right-sided

pneumothorax, which is stable.  Right-sided chest tube is in good position.  Right

lower lobe density is present consistent with an infiltrate. 



ASSESSMENT:  

1. Severe sepsis.

2. Empyema.

3. Acute kidney injury.

4. Healthcare-associated pneumonia.

5. Dementia, advanced.



DISCUSSION AND PLAN:  I will have a conversation with palliative care.  If the

family is interested in this being aggressive, we can consider things like PEG tube,

and tracheostomy.  That being said, if they would like to pursue comfort measures, I

do think it would be appropriate in this setting.  Pulmonary will continue to follow

along for the time being.  From my perspective, however, he is stable for transition

to the medical unit.  Gentle hydration will be continued. 







Job ID:  [id number]

## 2019-01-02 NOTE — RAD
FRONTAL RADIOGRAPH CHEST:

 

01/02/2019

 

HISTORY:

Ventilated CCU patient.

 

COMPARISON:

01/01/2019

 

FINDINGS:

There is a stable, small, right-sided pneumothorax with a component in the apex and the base.  Stable
 right-sided chest tube.  Nonspecific mild hazy increased linear density again noted in the right low
er lobe.  Left lung appears clear.  Heart and mediastinal contours are stable.  Stable dual-lead rosa
svenous pacing device on the right.

 

IMPRESSION:

No significant interval change.

 

POS: FILEMON

## 2019-01-02 NOTE — PRG
DATE OF SERVICE:  01/02/2019



SUBJECTIVE:  An 78-year-old gentleman, being seen for acute kidney injury.  The

patient is resting. 



OBJECTIVE:  CONSTITUTIONAL:  The patient is resting. 

VITAL SIGNS:  Afebrile, pulse 60, breathing 16, and blood pressure 137/64. 

GENERAL APPEARANCE AND MENTAL STATUS:  Fair. 

HEAD/NECK:  Normocephalic.   Atraumatic. 

EYES:  EOMI.  No deformity. 

EARS:  Clear.  No ulcers. 

NOSE:   Intact.  No lesions. 

MOUTH:  Clear.  No discharge. 

THROAT:  Clear.  No exudate. 

LUNGS:   Clear.  No crackles. 

CARDIAC:   S1, S2.  No rub. 

ABDOMEN:   Benign.  Bowel sounds positive. 

GENITALIA/RECTUM:  Rm absent. 

BACK/EXTREMITIES:  Edema 0+.    

NEUROLOGICAL:  Alert and motor intact.                      

SKIN:   

LYMPHATICS:



LABORATORY DATA:  Labs show hemoglobin 9.7, sodium 150, and creatinine 1.8.



ASSESSMENT AND PLAN:  

1. Acute kidney injury, improved.

2. Chronic kidney disease stage 3, stable.

3. Hypernatremia.  Recommend increasing the IV fluid of D5 water to 125 mL/h.

4. No indication for dialysis.







Job ID:  [id number]

## 2019-01-03 VITALS — TEMPERATURE: 97.5 F | SYSTOLIC BLOOD PRESSURE: 156 MMHG | DIASTOLIC BLOOD PRESSURE: 69 MMHG

## 2019-01-03 LAB
ANION GAP SERPL CALC-SCNC: 13 MMOL/L (ref 10–20)
BUN SERPL-MCNC: 31 MG/DL (ref 8.4–25.7)
CALCIUM SERPL-MCNC: 8.4 MG/DL (ref 7.8–10.44)
CHLORIDE SERPL-SCNC: 116 MMOL/L (ref 98–107)
CO2 SERPL-SCNC: 17 MMOL/L (ref 23–31)
CREAT CL PREDICTED SERPL C-G-VRATE: 45 ML/MIN (ref 70–130)
GLUCOSE SERPL-MCNC: 145 MG/DL (ref 83–110)
MAGNESIUM SERPL-MCNC: 2 MG/DL (ref 1.6–2.6)
POTASSIUM SERPL-SCNC: 3.8 MMOL/L (ref 3.5–5.1)
SODIUM SERPL-SCNC: 142 MMOL/L (ref 136–145)

## 2019-01-03 RX ADMIN — LIDOCAINE HYDROCHLORIDE SCH ML: 20 SOLUTION ORAL at 05:49

## 2019-01-03 RX ADMIN — LIDOCAINE HYDROCHLORIDE SCH ML: 20 SOLUTION ORAL at 01:23

## 2019-01-03 RX ADMIN — LIDOCAINE HYDROCHLORIDE SCH ML: 20 SOLUTION ORAL at 08:54

## 2019-01-03 RX ADMIN — PIPERACILLIN SODIUM, TAZOBACTAM SODIUM SCH MLS: 2; .25 INJECTION, POWDER, LYOPHILIZED, FOR SOLUTION INTRAVENOUS at 11:43

## 2019-01-03 RX ADMIN — LIDOCAINE HYDROCHLORIDE SCH ML: 20 SOLUTION ORAL at 18:23

## 2019-01-03 RX ADMIN — Medication SCH ML: at 08:54

## 2019-01-03 RX ADMIN — PIPERACILLIN SODIUM, TAZOBACTAM SODIUM SCH MLS: 2; .25 INJECTION, POWDER, LYOPHILIZED, FOR SOLUTION INTRAVENOUS at 05:49

## 2019-01-03 RX ADMIN — HEPARIN SODIUM SCH UNITS: 5000 INJECTION, SOLUTION INTRAVENOUS; SUBCUTANEOUS at 08:54

## 2019-01-03 RX ADMIN — LIDOCAINE HYDROCHLORIDE SCH ML: 20 SOLUTION ORAL at 11:43

## 2019-01-03 NOTE — PDOC.PN
- Subjective


Encounter Start Date: 01/03/19


Encounter Start Time: 13:46





Patient is here for follow-up of empyema . He appears comfortable. His family 

is at the bedside. His daughter says that he has not waken up all day. 





- Objective


Resuscitation Status - Order Detail:





01/02/19 14:50


Resuscitation Status Routine 


   Resuscitation Status: DNAR: NO Resuscitation


   Discussed with: d/w POA: Ms.Sahil and Juan


   Additional comments: Phone# to reach above: [phone], [phone]








MAR Reviewed: Yes


Vital Signs & Weight: 


 Vital Signs (12 hours)











  Temp Pulse Resp BP Pulse Ox


 


 01/03/19 10:46  97.8 F  64  16  129/74  100


 


 01/03/19 08:00      100


 


 01/03/19 07:23  97.9 F  63  16  147/63 H  100


 


 01/03/19 07:06   59 L  20   99


 


 01/03/19 04:00  98.5 F  60  20  135/64  99








 Weight











Weight                         142 lb 6.698 oz











 Most Recent Monitor Data











Heart Rate from ECG            60


 


NIBP                           132/57


 


NIBP BP-Mean                   82


 


Respiration from ECG           23


 


SpO2                           100














I&O: 


 











 01/02/19 01/03/19 01/04/19





 06:59 06:59 06:59


 


Intake Total 2895 1375 


 


Output Total 2470 2530 


 


Balance 425 -1155 











Result Diagrams: 


 01/02/19 08:06





 01/03/19 06:39





Phys Exam





- Physical Examination


+ rhonchi bilaterally, and occasional wheeze


Cardiovascular: RRR, no significant murmur, no rub


Gastrointestinal: soft, non-tender, no distention, positive bowel sounds


Musculoskeletal: no edema





Dx/Plan


(1) Empyema lung


Code(s): J86.9 - PYOTHORAX WITHOUT FISTULA   Status: Acute   Comment: has chest 

tube   





(2) PNA (pneumonia)


Code(s): J18.9 - PNEUMONIA, UNSPECIFIED ORGANISM   Status: Acute   


Qualifiers: 


   Pneumonia type: aspiration pneumonia   Laterality: right   Lung location: 

lower lobe of lung 





(3) Acute respiratory failure with hypoxia


Code(s): J96.01 - ACUTE RESPIRATORY FAILURE WITH HYPOXIA   Status: Resolved   





(4) Moderate protein-calorie malnutrition


Code(s): E44.0 - MODERATE PROTEIN-CALORIE MALNUTRITION   Status: Chronic   





(5) HTN (hypertension)


Code(s): I10 - ESSENTIAL (PRIMARY) HYPERTENSION   Status: Chronic   


Qualifiers: 


   Hypertension type: essential hypertension 





(6) Dementia


Code(s): F03.90 - UNSPECIFIED DEMENTIA WITHOUT BEHAVIORAL DISTURBANCE   Status: 

Acute   





- Plan





* Empyema- the daughters are certain on Palliative care only, and have asked me 

to discontinue all active treatment


* His death appears eminent, and I believe he is appropriate for inpatient 

hospice. I have discussed this with Case Management.

## 2019-01-03 NOTE — PRG
DATE OF SERVICE:  01/03/2019



SERVICE:  Pulmonary Medicine.



INTERVAL HISTORY:  The patient is doing fine from Respiratory standpoint.  He

appears to be comfortable.  There has been no interval change to his condition.  The

family has decided to pursue comfort measures only.  Otherwise, he cannot provide

any additional elements of the history.  There were no reported events overnight. 



PHYSICAL EXAMINATION:

VITAL SIGNS:  Afebrile.  Pulse 64, blood pressure 129/74, respirations 16, and

saturation 100% on room air. 

GENERAL:  The patient is awake and alert, in no apparent distress. 

LUNGS:  Excellent air entry on the left.  There is decreased air entry at the right

base.  Crackles and rhonchi are present.  No wheezing. 

HEART:  Normal rate and regular. 

ABDOMEN:  Soft, nontender, and nondistended.  Bowel sounds are positive. 

MUSCULOSKELETAL:  No cyanosis or clubbing.  There is no pitting in the bilateral

lower extremities. 

NEUROLOGIC:  Grossly nonfocal.  He has very advanced dementia, he is not following

any commands. 



LABORATORY DATA:  Sodium 142 and downtrending, chloride 116 and downtrending.  Basic

metabolic profile is otherwise unremarkable.  Creatinine has improved to 1.19.

Magnesium and phosphorous are now within the normal limits.  Pleural fluid is

growing Strep anginosus.  AFB smear and culture, negative.  Blood cultures x2 are

unremarkable. 



IMAGING DATA:  Chest x-ray demonstrates good placement of the right-sided

thoracostomy tube.  There is a rim of pneumothorax present around the periphery of

the lung. 



ASSESSMENT:  

1. Severe sepsis.

2. Empyema secondary to Streptococcus anginosus.

3. Dementia, advanced.

4. Acute kidney injury, resolved.



DISCUSSION AND PLAN:  The patient is being transition over to comfort care only.  At

this point, he has no further requirements for inpatient Pulmonary or Critical Care

opinion.  As such, I will sign off.  Please call with additional questions or

concerns moving forward. 







Job ID:  [id number]

## 2019-01-03 NOTE — RAD
PORTABLE UPRIGHT FRONTAL CHEST RADIOGRAPH:

 

Date:  01/03/19

 

COMPARISON:  

01/02/19. 

 

HISTORY:  

Reevaluate chest tube and pneumothorax. 

 

FINDINGS:

Stable chest tube overlies the mid right lung zone. Stable dual lead transvenous pacing device. 

 

Small pneumothorax noted on the right with an apical and a basilar component. Stable blunting of righ
t costophrenic angle with hazy density in the right lung base. Left lung is clear. 

 

IMPRESSION: 

Stable appearance of the chest as detailed above. 

 

 

POS: FILEMON

## 2019-01-04 NOTE — OP
DATE OF PROCEDURE:  12/30/2018



SERVICE:  Pulmonary Medicine.



PROCEDURE PERFORMED:  Right-sided pleural drainage with catheter insertion under

ultrasound guidance. 



CONSENT:  The risks and benefits of this procedure were explained to the patient's

surrogates.  All questions were answered, and alternative options explained. 



MEDICATIONS USED:  Lidocaine 1% without epinephrine, total quantity 8 mL.



PREPROCEDURE DIAGNOSES:  

1. Severe sepsis.

2. Pleural effusion.



POSTPROCEDURE DIAGNOSES:  

1. Severe sepsis.

2. Empyema.



DESCRIPTION OF PROCEDURE:  Time-out was performed by the procedure team and the

patient.  The patient was positively identified using name and date of birth.  The

procedure site was marked.  Vital sign monitoring was accomplished by noninvasive

hemodynamic monitoring, pulse oximetry, and telemetry. 



In the seated position, the right posterior hemithorax was examined using ultrasound

probe.  The diaphragm and pleural fluid were easily identified.  The skin was

prepped and draped in usual sterile fashion and anesthetized with 1% lidocaine

without epinephrine.  A finder needle was inserted in the pleural space with return

of purulent brown foul-smelling fluid.  A pleural drainage catheter was then

inserted in the same location, a total quantity of 1100 mL of pleural fluid was

withdrawn by syringe pump technique.  The sample was sent for analysis.  Evacuation

of fluid was terminated because the fluid stopped coming.  At the end of the

procedure, estimated pleural pressures, measured by manometry, was -15 cm of pleural

fluid.  Intact catheter was withdrawn, on exhalation, and a sterile dressing was

applied.  The patient had stable vitals throughout the entire procedure. 



ESTIMATED BLOOD LOSS:  2 mL.



COMPLICATIONS:  None.







Job ID:  [id number]

## 2019-01-04 NOTE — DIS
DATE OF ADMISSION:  12/30/2018



DATE OF DISCHARGE:  01/03/2019



DISCHARGE DISPOSITION:  Inpatient hospice.



DISCHARGE DIAGNOSES:  

1. Aspiration pneumonia with large empyema.

2. Advanced dementia.

3. Previous subdural hematoma.

4. Hypertension.

5. History of cerebrovascular accident with left hemiparesis.

6. Diastolic dysfunction.

7. Pacemaker placement.

8. Lumbar radiculopathy.

9. Coronary artery disease.



DISCHARGE MEDICATIONS:  None.



CODE STATUS:  Do not attempt resuscitation.



PROCEDURES:  The procedures done during admission, the patient had a CT scan of the

chest showing interval development of a large right pleural effusion, a portion of

which is partially loculated.  There is an area of consolidation in the right lung.

Also, there was notice of an enlarged thyroid gland and multiple hypodense renal

lesions.  The patient also had a chest tube placement to the right lung. 



HOSPITAL COURSE:  Mr. Reyes is an 78-year-old gentleman who presented to the

emergency room with acute respiratory failure with hypoxemia.  He was evaluated in

the ER and found to have a right lower lobe pneumonia with a large right

parapneumonic effusion.  The area was loculated and it was found to be an empyema.

He was seen by Pulmonology as well as Vascular or Thoracic Surgery.  He underwent

right tube thoracostomy placement and with the removal of greater than 1.9 L of

alba pus from the right lung.  He continued on IV antibiotics for several days.  He

seemed to not be progressing well primarily from his mental status as the patient

actually was oxygenating well on room air.  His family, however, decided to not

pursue further aggressive treatment.  [facility] came when the patient had

failed his swallowing study and there were concerns about whether or not to place a

PEG tube.  At this point, the family decided that he would never have wanted a PEG

tube that he has advanced dementia as well as multiple other comorbidities and

decided to transition him to comfort care only.  They also asked to discontinue IV

antibiotics and have the chest tube removed and after this was done, he was

discharged to the inpatient hospice facility. 







Job ID:  [id number]

## 2024-04-27 NOTE — PRG
DATE OF SERVICE:  01/01/2019



SUBJECTIVE:  78-year-old gentleman being seen for acute kidney injury.  The patient

remained resting. 



OBJECTIVE:  VITAL SIGNS:  Afebrile, pulse 85, breathing 16, blood pressure 131/71. 

Awake, alert, in no acute distress. 

GENERAL APPEARANCE AND MENTAL STATUS:  Fair. 

HEAD/NECK:  Normocephalic.   Atraumatic. 

EYES:  EOMI.  No deformity. 

EARS:  Clear.  No ulcers. 

NOSE:   Intact.  No lesions. 

MOUTH:  Clear.  No discharge. 

THROAT:  Clear.  No exudate. 

LUNGS:   Clear.  No crackles. 

CARDIAC:   S1, S2.  No rub. 

ABDOMEN:   Benign.  Bowel sounds positive. 

GENITALIA/RECTUM:  Rm absent. 

BACK/EXTREMITIES:  Edema 0+. 

NEUROLOGICAL:  Alert and motor intact.                      

SKIN:   

LYMPHATICS:



LABS:  Hemoglobin 8.9, sodium 150, creatinine 2.8.



ASSESSMENT AND PLAN:  

1. Acute kidney injury, improved.

2. Chronic kidney disease stage 4, stable.

3. Hypernatremia, improving.  Continue D5 water.  No indication for dialysis at this

time. 

4. Hyperkalemia has resolved.







Job ID:  [id number] On call MD ordered Critical care cons. Critical care MDs to bedside